# Patient Record
Sex: FEMALE | Race: WHITE | NOT HISPANIC OR LATINO | Employment: OTHER | URBAN - METROPOLITAN AREA
[De-identification: names, ages, dates, MRNs, and addresses within clinical notes are randomized per-mention and may not be internally consistent; named-entity substitution may affect disease eponyms.]

---

## 2018-04-06 ENCOUNTER — OFFICE VISIT CONVERTED (OUTPATIENT)
Dept: FAMILY MEDICINE CLINIC | Facility: CLINIC | Age: 83
End: 2018-04-06
Attending: FAMILY MEDICINE

## 2018-05-24 ENCOUNTER — OFFICE VISIT CONVERTED (OUTPATIENT)
Dept: FAMILY MEDICINE CLINIC | Facility: CLINIC | Age: 83
End: 2018-05-24
Attending: FAMILY MEDICINE

## 2018-07-17 ENCOUNTER — OFFICE VISIT CONVERTED (OUTPATIENT)
Dept: ORTHOPEDIC SURGERY | Facility: CLINIC | Age: 83
End: 2018-07-17
Attending: ORTHOPAEDIC SURGERY

## 2018-08-31 ENCOUNTER — OFFICE VISIT CONVERTED (OUTPATIENT)
Dept: FAMILY MEDICINE CLINIC | Facility: CLINIC | Age: 83
End: 2018-08-31
Attending: FAMILY MEDICINE

## 2018-08-31 ENCOUNTER — CONVERSION ENCOUNTER (OUTPATIENT)
Dept: FAMILY MEDICINE CLINIC | Facility: CLINIC | Age: 83
End: 2018-08-31

## 2018-11-01 ENCOUNTER — OFFICE VISIT CONVERTED (OUTPATIENT)
Dept: FAMILY MEDICINE CLINIC | Facility: CLINIC | Age: 83
End: 2018-11-01
Attending: FAMILY MEDICINE

## 2018-11-01 ENCOUNTER — CONVERSION ENCOUNTER (OUTPATIENT)
Dept: FAMILY MEDICINE CLINIC | Facility: CLINIC | Age: 83
End: 2018-11-01

## 2019-01-11 ENCOUNTER — HOSPITAL ENCOUNTER (OUTPATIENT)
Dept: OTHER | Facility: HOSPITAL | Age: 84
Setting detail: RECURRING SERIES
Discharge: HOME | End: 2019-02-20
Attending: NURSE PRACTITIONER

## 2019-01-15 ENCOUNTER — CONVERSION ENCOUNTER (OUTPATIENT)
Dept: FAMILY MEDICINE CLINIC | Facility: CLINIC | Age: 84
End: 2019-01-15

## 2019-01-15 ENCOUNTER — OFFICE VISIT CONVERTED (OUTPATIENT)
Dept: FAMILY MEDICINE CLINIC | Facility: CLINIC | Age: 84
End: 2019-01-15
Attending: FAMILY MEDICINE

## 2019-02-26 ENCOUNTER — CONVERSION ENCOUNTER (OUTPATIENT)
Dept: FAMILY MEDICINE CLINIC | Facility: CLINIC | Age: 84
End: 2019-02-26

## 2019-02-26 ENCOUNTER — OFFICE VISIT CONVERTED (OUTPATIENT)
Dept: FAMILY MEDICINE CLINIC | Facility: CLINIC | Age: 84
End: 2019-02-26
Attending: FAMILY MEDICINE

## 2019-02-26 ENCOUNTER — HOSPITAL ENCOUNTER (OUTPATIENT)
Dept: FAMILY MEDICINE CLINIC | Facility: CLINIC | Age: 84
Discharge: HOME | End: 2019-02-26
Attending: FAMILY MEDICINE

## 2019-02-26 LAB
EST. AVERAGE GLUCOSE BLD GHB EST-MCNC: 126 MG/DL
HBA1C MFR BLD: 6 % (ref 3.5–5.7)

## 2019-02-27 LAB
CHOLEST SERPL-MCNC: 125 MG/DL (ref 107–200)
CHOLEST/HDLC SERPL: 3.2 {RATIO} (ref 3–6)
HDLC SERPL-MCNC: 39 MG/DL (ref 40–60)
LDLC SERPL CALC-MCNC: 61 MG/DL (ref 70–100)
T4 FREE SERPL-MCNC: 1.2 NG/DL (ref 0.9–1.8)
TRIGL SERPL-MCNC: 125 MG/DL (ref 40–150)
TSH SERPL-ACNC: 2.75 M[IU]/L (ref 0.27–4.2)
VLDLC SERPL-MCNC: 25 MG/DL (ref 5–37)

## 2019-05-06 ENCOUNTER — HOSPITAL ENCOUNTER (OUTPATIENT)
Dept: URGENT CARE | Facility: CLINIC | Age: 84
Discharge: HOME | End: 2019-05-06
Attending: FAMILY MEDICINE

## 2019-05-13 ENCOUNTER — OFFICE VISIT CONVERTED (OUTPATIENT)
Dept: FAMILY MEDICINE CLINIC | Facility: CLINIC | Age: 84
End: 2019-05-13
Attending: FAMILY MEDICINE

## 2019-05-17 ENCOUNTER — HOSPITAL ENCOUNTER (OUTPATIENT)
Dept: OTHER | Facility: HOSPITAL | Age: 84
Discharge: HOME | End: 2019-05-17
Attending: FAMILY MEDICINE

## 2019-06-27 ENCOUNTER — OFFICE VISIT CONVERTED (OUTPATIENT)
Dept: FAMILY MEDICINE CLINIC | Facility: CLINIC | Age: 84
End: 2019-06-27
Attending: FAMILY MEDICINE

## 2019-08-26 ENCOUNTER — OFFICE VISIT CONVERTED (OUTPATIENT)
Dept: FAMILY MEDICINE CLINIC | Facility: CLINIC | Age: 84
End: 2019-08-26
Attending: FAMILY MEDICINE

## 2019-08-26 ENCOUNTER — HOSPITAL ENCOUNTER (OUTPATIENT)
Dept: FAMILY MEDICINE CLINIC | Facility: CLINIC | Age: 84
Discharge: HOME | End: 2019-08-26
Attending: FAMILY MEDICINE

## 2019-08-26 ENCOUNTER — CONVERSION ENCOUNTER (OUTPATIENT)
Dept: FAMILY MEDICINE CLINIC | Facility: CLINIC | Age: 84
End: 2019-08-26

## 2019-08-26 LAB
ALBUMIN SERPL-MCNC: 4.9 G/DL (ref 3.5–5)
ALBUMIN/GLOB SERPL: 1.5 {RATIO} (ref 1.4–2.6)
ALP SERPL-CCNC: 90 U/L (ref 43–160)
ALT SERPL-CCNC: 21 U/L (ref 10–40)
ANION GAP SERPL CALC-SCNC: 19 MMOL/L (ref 8–19)
AST SERPL-CCNC: 19 U/L (ref 15–50)
BASOPHILS # BLD AUTO: 0.1 10*3/UL (ref 0–0.2)
BASOPHILS NFR BLD AUTO: 1.2 % (ref 0–3)
BILIRUB SERPL-MCNC: 0.35 MG/DL (ref 0.2–1.3)
BUN SERPL-MCNC: 14 MG/DL (ref 5–25)
BUN/CREAT SERPL: 21 {RATIO} (ref 6–20)
CALCIUM SERPL-MCNC: 9.8 MG/DL (ref 8.7–10.4)
CHLORIDE SERPL-SCNC: 104 MMOL/L (ref 99–111)
CHOLEST SERPL-MCNC: 143 MG/DL (ref 107–200)
CHOLEST/HDLC SERPL: 3.3 {RATIO} (ref 3–6)
CONV ABS IMM GRAN: 0.03 10*3/UL (ref 0–0.2)
CONV CO2: 23 MMOL/L (ref 22–32)
CONV IMMATURE GRAN: 0.3 % (ref 0–1.8)
CONV TOTAL PROTEIN: 8.1 G/DL (ref 6.3–8.2)
CREAT UR-MCNC: 0.67 MG/DL (ref 0.5–0.9)
DEPRECATED RDW RBC AUTO: 45.2 FL (ref 36.4–46.3)
EOSINOPHIL # BLD AUTO: 0.44 10*3/UL (ref 0–0.7)
EOSINOPHIL # BLD AUTO: 5.1 % (ref 0–7)
ERYTHROCYTE [DISTWIDTH] IN BLOOD BY AUTOMATED COUNT: 13.2 % (ref 11.7–14.4)
EST. AVERAGE GLUCOSE BLD GHB EST-MCNC: 114 MG/DL
GFR SERPLBLD BASED ON 1.73 SQ M-ARVRAT: >60 ML/MIN/{1.73_M2}
GLOBULIN UR ELPH-MCNC: 3.2 G/DL (ref 2–3.5)
GLUCOSE SERPL-MCNC: 99 MG/DL (ref 65–99)
HBA1C MFR BLD: 5.6 % (ref 3.5–5.7)
HCT VFR BLD AUTO: 47.6 % (ref 37–47)
HDLC SERPL-MCNC: 44 MG/DL (ref 40–60)
HGB BLD-MCNC: 15.5 G/DL (ref 12–16)
LDLC SERPL CALC-MCNC: 72 MG/DL (ref 70–100)
LYMPHOCYTES # BLD AUTO: 2.39 10*3/UL (ref 1–5)
LYMPHOCYTES NFR BLD AUTO: 27.9 % (ref 20–45)
MCH RBC QN AUTO: 30.2 PG (ref 27–31)
MCHC RBC AUTO-ENTMCNC: 32.6 G/DL (ref 33–37)
MCV RBC AUTO: 92.8 FL (ref 81–99)
MONOCYTES # BLD AUTO: 0.83 10*3/UL (ref 0.2–1.2)
MONOCYTES NFR BLD AUTO: 9.7 % (ref 3–10)
NEUTROPHILS # BLD AUTO: 4.79 10*3/UL (ref 2–8)
NEUTROPHILS NFR BLD AUTO: 55.8 % (ref 30–85)
NRBC CBCN: 0 % (ref 0–0.7)
OSMOLALITY SERPL CALC.SUM OF ELEC: 295 MOSM/KG (ref 273–304)
PLATELET # BLD AUTO: 295 10*3/UL (ref 130–400)
PMV BLD AUTO: 12.3 FL (ref 9.4–12.3)
POTASSIUM SERPL-SCNC: 3.7 MMOL/L (ref 3.5–5.3)
RBC # BLD AUTO: 5.13 10*6/UL (ref 4.2–5.4)
SODIUM SERPL-SCNC: 142 MMOL/L (ref 135–147)
T4 FREE SERPL-MCNC: 1.4 NG/DL (ref 0.9–1.8)
TRIGL SERPL-MCNC: 133 MG/DL (ref 40–150)
TSH SERPL-ACNC: 2.31 M[IU]/L (ref 0.27–4.2)
VLDLC SERPL-MCNC: 27 MG/DL (ref 5–37)
WBC # BLD AUTO: 8.58 10*3/UL (ref 4.8–10.8)

## 2019-08-27 LAB — 25(OH)D3 SERPL-MCNC: 43.7 NG/ML (ref 30–100)

## 2019-10-11 ENCOUNTER — OFFICE VISIT CONVERTED (OUTPATIENT)
Dept: FAMILY MEDICINE CLINIC | Facility: CLINIC | Age: 84
End: 2019-10-11
Attending: FAMILY MEDICINE

## 2019-10-13 ENCOUNTER — HOSPITAL ENCOUNTER (OUTPATIENT)
Dept: GENERAL RADIOLOGY | Facility: HOSPITAL | Age: 84
Discharge: HOME | End: 2019-10-13
Attending: FAMILY MEDICINE

## 2019-11-01 ENCOUNTER — OFFICE VISIT CONVERTED (OUTPATIENT)
Dept: FAMILY MEDICINE CLINIC | Facility: CLINIC | Age: 84
End: 2019-11-01
Attending: FAMILY MEDICINE

## 2020-05-30 ENCOUNTER — HOSPITAL ENCOUNTER (OUTPATIENT)
Dept: URGENT CARE | Facility: CLINIC | Age: 85
Discharge: HOME | End: 2020-05-30
Attending: FAMILY MEDICINE

## 2020-06-01 LAB — BACTERIA UR CULT: NORMAL

## 2020-06-30 ENCOUNTER — OFFICE VISIT CONVERTED (OUTPATIENT)
Dept: INTERNAL MEDICINE | Facility: CLINIC | Age: 85
End: 2020-06-30
Attending: PHYSICIAN ASSISTANT

## 2020-06-30 ENCOUNTER — CONVERSION ENCOUNTER (OUTPATIENT)
Dept: INTERNAL MEDICINE | Facility: CLINIC | Age: 85
End: 2020-06-30

## 2020-07-06 ENCOUNTER — OFFICE VISIT CONVERTED (OUTPATIENT)
Dept: INTERNAL MEDICINE | Facility: CLINIC | Age: 85
End: 2020-07-06
Attending: PHYSICIAN ASSISTANT

## 2020-07-06 ENCOUNTER — HOSPITAL ENCOUNTER (OUTPATIENT)
Dept: OTHER | Facility: HOSPITAL | Age: 85
Discharge: HOME | End: 2020-07-06
Attending: PHYSICIAN ASSISTANT

## 2020-07-06 LAB
25(OH)D3 SERPL-MCNC: 46 NG/ML (ref 30–100)
ALBUMIN SERPL-MCNC: 4.4 G/DL (ref 3.5–5)
ALBUMIN/GLOB SERPL: 1.4 {RATIO} (ref 1.4–2.6)
ALP SERPL-CCNC: 93 U/L (ref 43–160)
ALT SERPL-CCNC: 20 U/L (ref 10–40)
ANION GAP SERPL CALC-SCNC: 17 MMOL/L (ref 8–19)
AST SERPL-CCNC: 19 U/L (ref 15–50)
BASOPHILS # BLD AUTO: 0.05 10*3/UL (ref 0–0.2)
BASOPHILS NFR BLD AUTO: 0.7 % (ref 0–3)
BILIRUB SERPL-MCNC: 0.42 MG/DL (ref 0.2–1.3)
BUN SERPL-MCNC: 17 MG/DL (ref 5–25)
BUN/CREAT SERPL: 19 {RATIO} (ref 6–20)
CALCIUM SERPL-MCNC: 9.6 MG/DL (ref 8.7–10.4)
CHLORIDE SERPL-SCNC: 105 MMOL/L (ref 99–111)
CHOLEST SERPL-MCNC: 140 MG/DL (ref 107–200)
CHOLEST/HDLC SERPL: 3.8 {RATIO} (ref 3–6)
CONV ABS IMM GRAN: 0.02 10*3/UL (ref 0–0.2)
CONV CO2: 21 MMOL/L (ref 22–32)
CONV IMMATURE GRAN: 0.3 % (ref 0–1.8)
CONV TOTAL PROTEIN: 7.5 G/DL (ref 6.3–8.2)
CREAT UR-MCNC: 0.91 MG/DL (ref 0.5–0.9)
DEPRECATED RDW RBC AUTO: 48.3 FL (ref 36.4–46.3)
EOSINOPHIL # BLD AUTO: 0.35 10*3/UL (ref 0–0.7)
EOSINOPHIL # BLD AUTO: 4.8 % (ref 0–7)
ERYTHROCYTE [DISTWIDTH] IN BLOOD BY AUTOMATED COUNT: 14.1 % (ref 11.7–14.4)
GFR SERPLBLD BASED ON 1.73 SQ M-ARVRAT: 58 ML/MIN/{1.73_M2}
GLOBULIN UR ELPH-MCNC: 3.1 G/DL (ref 2–3.5)
GLUCOSE SERPL-MCNC: 120 MG/DL (ref 65–99)
HCT VFR BLD AUTO: 44.9 % (ref 37–47)
HDLC SERPL-MCNC: 37 MG/DL (ref 40–60)
HGB BLD-MCNC: 14.5 G/DL (ref 12–16)
LDLC SERPL CALC-MCNC: 66 MG/DL (ref 70–100)
LYMPHOCYTES # BLD AUTO: 1.9 10*3/UL (ref 1–5)
LYMPHOCYTES NFR BLD AUTO: 25.9 % (ref 20–45)
MCH RBC QN AUTO: 30 PG (ref 27–31)
MCHC RBC AUTO-ENTMCNC: 32.3 G/DL (ref 33–37)
MCV RBC AUTO: 92.8 FL (ref 81–99)
MONOCYTES # BLD AUTO: 0.8 10*3/UL (ref 0.2–1.2)
MONOCYTES NFR BLD AUTO: 10.9 % (ref 3–10)
NEUTROPHILS # BLD AUTO: 4.22 10*3/UL (ref 2–8)
NEUTROPHILS NFR BLD AUTO: 57.4 % (ref 30–85)
NRBC CBCN: 0 % (ref 0–0.7)
OSMOLALITY SERPL CALC.SUM OF ELEC: 291 MOSM/KG (ref 273–304)
PLATELET # BLD AUTO: 304 10*3/UL (ref 130–400)
PMV BLD AUTO: 11.8 FL (ref 9.4–12.3)
POTASSIUM SERPL-SCNC: 3.7 MMOL/L (ref 3.5–5.3)
RBC # BLD AUTO: 4.84 10*6/UL (ref 4.2–5.4)
SODIUM SERPL-SCNC: 139 MMOL/L (ref 135–147)
T4 FREE SERPL-MCNC: 1.5 NG/DL (ref 0.9–1.8)
TRIGL SERPL-MCNC: 184 MG/DL (ref 40–150)
TSH SERPL-ACNC: 1.77 M[IU]/L (ref 0.27–4.2)
VLDLC SERPL-MCNC: 37 MG/DL (ref 5–37)
WBC # BLD AUTO: 7.34 10*3/UL (ref 4.8–10.8)

## 2020-07-10 LAB
EST. AVERAGE GLUCOSE BLD GHB EST-MCNC: 123 MG/DL
HBA1C MFR BLD: 5.9 % (ref 3.5–5.7)

## 2020-08-06 ENCOUNTER — CONVERSION ENCOUNTER (OUTPATIENT)
Dept: OTHER | Facility: HOSPITAL | Age: 85
End: 2020-08-06

## 2020-08-06 ENCOUNTER — OFFICE VISIT CONVERTED (OUTPATIENT)
Dept: INTERNAL MEDICINE | Facility: CLINIC | Age: 85
End: 2020-08-06
Attending: PHYSICIAN ASSISTANT

## 2020-08-06 ENCOUNTER — OFFICE VISIT CONVERTED (OUTPATIENT)
Dept: GASTROENTEROLOGY | Facility: CLINIC | Age: 85
End: 2020-08-06
Attending: NURSE PRACTITIONER

## 2020-09-01 ENCOUNTER — HOSPITAL ENCOUNTER (OUTPATIENT)
Dept: URGENT CARE | Facility: CLINIC | Age: 85
Discharge: HOME | End: 2020-09-01

## 2020-09-23 ENCOUNTER — CONVERSION ENCOUNTER (OUTPATIENT)
Dept: INTERNAL MEDICINE | Facility: CLINIC | Age: 85
End: 2020-09-23

## 2020-09-23 ENCOUNTER — OFFICE VISIT CONVERTED (OUTPATIENT)
Dept: INTERNAL MEDICINE | Facility: CLINIC | Age: 85
End: 2020-09-23
Attending: PHYSICIAN ASSISTANT

## 2021-02-12 ENCOUNTER — HOSPITAL ENCOUNTER (OUTPATIENT)
Dept: OTHER | Facility: HOSPITAL | Age: 86
Discharge: HOME | End: 2021-02-12
Attending: STUDENT IN AN ORGANIZED HEALTH CARE EDUCATION/TRAINING PROGRAM

## 2021-02-12 ENCOUNTER — OFFICE VISIT CONVERTED (OUTPATIENT)
Dept: INTERNAL MEDICINE | Facility: CLINIC | Age: 86
End: 2021-02-12
Attending: STUDENT IN AN ORGANIZED HEALTH CARE EDUCATION/TRAINING PROGRAM

## 2021-02-12 LAB
25(OH)D3 SERPL-MCNC: 44.5 NG/ML (ref 30–100)
ALBUMIN SERPL-MCNC: 4.9 G/DL (ref 3.5–5)
ALBUMIN/GLOB SERPL: 1.5 {RATIO} (ref 1.4–2.6)
ALP SERPL-CCNC: 110 U/L (ref 43–160)
ALT SERPL-CCNC: 17 U/L (ref 10–40)
ANION GAP SERPL CALC-SCNC: 15 MMOL/L (ref 8–19)
AST SERPL-CCNC: 18 U/L (ref 15–50)
BASOPHILS # BLD AUTO: 0.08 10*3/UL (ref 0–0.2)
BASOPHILS NFR BLD AUTO: 1 % (ref 0–3)
BILIRUB SERPL-MCNC: 0.34 MG/DL (ref 0.2–1.3)
BUN SERPL-MCNC: 15 MG/DL (ref 5–25)
BUN/CREAT SERPL: 21 {RATIO} (ref 6–20)
CALCIUM SERPL-MCNC: 10.3 MG/DL (ref 8.7–10.4)
CHLORIDE SERPL-SCNC: 103 MMOL/L (ref 99–111)
CHOLEST SERPL-MCNC: 143 MG/DL (ref 107–200)
CHOLEST/HDLC SERPL: 3 {RATIO} (ref 3–6)
CONV ABS IMM GRAN: 0.02 10*3/UL (ref 0–0.2)
CONV CO2: 26 MMOL/L (ref 22–32)
CONV IMMATURE GRAN: 0.2 % (ref 0–1.8)
CONV TOTAL PROTEIN: 8.1 G/DL (ref 6.3–8.2)
CREAT UR-MCNC: 0.73 MG/DL (ref 0.5–0.9)
DEPRECATED RDW RBC AUTO: 43.8 FL (ref 36.4–46.3)
EOSINOPHIL # BLD AUTO: 0.49 10*3/UL (ref 0–0.7)
EOSINOPHIL # BLD AUTO: 6 % (ref 0–7)
ERYTHROCYTE [DISTWIDTH] IN BLOOD BY AUTOMATED COUNT: 13.2 % (ref 11.7–14.4)
GFR SERPLBLD BASED ON 1.73 SQ M-ARVRAT: >60 ML/MIN/{1.73_M2}
GLOBULIN UR ELPH-MCNC: 3.2 G/DL (ref 2–3.5)
GLUCOSE SERPL-MCNC: 132 MG/DL (ref 65–99)
HCT VFR BLD AUTO: 48.2 % (ref 37–47)
HDLC SERPL-MCNC: 47 MG/DL (ref 40–60)
HGB BLD-MCNC: 16.2 G/DL (ref 12–16)
LDLC SERPL CALC-MCNC: 75 MG/DL (ref 70–100)
LYMPHOCYTES # BLD AUTO: 2.35 10*3/UL (ref 1–5)
LYMPHOCYTES NFR BLD AUTO: 28.8 % (ref 20–45)
MCH RBC QN AUTO: 30.5 PG (ref 27–31)
MCHC RBC AUTO-ENTMCNC: 33.6 G/DL (ref 33–37)
MCV RBC AUTO: 90.8 FL (ref 81–99)
MONOCYTES # BLD AUTO: 0.79 10*3/UL (ref 0.2–1.2)
MONOCYTES NFR BLD AUTO: 9.7 % (ref 3–10)
NEUTROPHILS # BLD AUTO: 4.43 10*3/UL (ref 2–8)
NEUTROPHILS NFR BLD AUTO: 54.3 % (ref 30–85)
NRBC CBCN: 0 % (ref 0–0.7)
OSMOLALITY SERPL CALC.SUM OF ELEC: 291 MOSM/KG (ref 273–304)
PLATELET # BLD AUTO: 358 10*3/UL (ref 130–400)
PMV BLD AUTO: 11.5 FL (ref 9.4–12.3)
POTASSIUM SERPL-SCNC: 5 MMOL/L (ref 3.5–5.3)
RBC # BLD AUTO: 5.31 10*6/UL (ref 4.2–5.4)
SODIUM SERPL-SCNC: 139 MMOL/L (ref 135–147)
TRIGL SERPL-MCNC: 106 MG/DL (ref 40–150)
TSH SERPL-ACNC: 6.25 M[IU]/L (ref 0.27–4.2)
VLDLC SERPL-MCNC: 21 MG/DL (ref 5–37)
WBC # BLD AUTO: 8.16 10*3/UL (ref 4.8–10.8)

## 2021-02-24 ENCOUNTER — OFFICE VISIT CONVERTED (OUTPATIENT)
Dept: INTERNAL MEDICINE | Facility: CLINIC | Age: 86
End: 2021-02-24
Attending: STUDENT IN AN ORGANIZED HEALTH CARE EDUCATION/TRAINING PROGRAM

## 2021-05-10 NOTE — H&P
History and Physical      Patient Name: Saba Galarza   Patient ID: [id number]   Sex: Female   YOB: 1935    Primary Care Provider: Tosin Bai PA-C   Referring Provider: Tosin Bai PA-C    Visit Date: August 6, 2020    Provider: RADHA Byers   Location: [facility]   Location Address: [address] [place] [address]  [place]  [id number]   Location Phone: [phone]          Chief Complaint  · Fecal incontinence      History Of Present Illness  The patient is a(n) 84 year old /White female who is in the office today with a referral from Tosin Bai PA-C for evaluation of fecal incontince.        Patient presents today with complaints of fecal incontinence.  She has had this problem for approximately 1 year now.  The fecal incontinence occurs randomly.  She does report that she has urine leakage as well.  She denies any abdominal surgeries.  She reports having normal bowel movements, but will have occasional constipation and diarrhea.  She denies nocturnal diarrhea.  She has reported seen 1-2 incidences of blood in her stool approximately 6 to 8 weeks ago.  She reports it was bright red and in the toilet bowl and with wiping.  She denies abdominal pain.  She denies nausea and vomiting.  She denies family history of colon cancer.    Labs 07/06/2020: Hemoglobin 14.5, hematocrit 44.9, platelets 304, ALT 20, AST 19, total bili 0.42, alk phos 93       Past Medical History  Anemia, Unspecified; Arthritis; CKD (chronic kidney disease) stage 3, GFR 30-59 ml/min; DDD (degenerative disc disease), lumbar; Depression; Depression with anxiety; Diabetes Mellitus, Type II: controlled; Forgetfulness; Hyperlipemia; Hypertension; Hypothyroidism; Limb Swelling; Low back pain; Paranoid delusion; Paranoid schizophrenia; Primary osteoarthritis of both knees; Primary osteoarthritis of left hip; Seasonal allergies; Stroke; Stroke         Past Surgical History  Colonoscopy; Dilation and Curettage; Eye  Implant; Tonsillectomy         Medication List  amlodipine 5 mg oral tablet; Aspir-81 81 mg oral tablet,delayed release (DR/EC); atorvastatin 10 mg oral tablet; Calcium 500 + D 500 mg(1,250mg) -200 unit oral tablet; cetirizine 10 mg oral tablet; Detrol LA 4 mg oral capsule,extended release 24hr; levothyroxine 88 mcg oral tablet; methocarbamol 750 mg oral tablet; Micardis 20 mg oral tablet; potassium chloride 20 mEq oral tablet extended release; triamcinolone acetonide 0.1 % topical cream; Vitamin D3 1,000 unit oral tablet         Allergy List  PENICILLINS; SULFA (SULFONAMIDES)       Allergies Reconciled  Family Medical History  Breast Neoplasm, Malignant; Heart Disease; Cancer, Unspecified; Diabetes, unspecified type; Hypertension; Family history of certain chronic disabling diseases; arthritis; Osteoporosis         Social History  Alcohol (Never); lives alone; Recreational Drug Use (Never); Retired.; Tobacco (Never);          Review of Systems  · Constitutional  o Denies  o : chills, fever  · Eyes  o Denies  o : blurred vision, changes in vision  · Cardiovascular  o Denies  o : chest pain  · Respiratory  o Denies  o : shortness of breath  · Gastrointestinal  o Denies  o : nausea, vomiting  · Genitourinary  o Denies  o : dysuria, blood in urine  · Integument  o Denies  o : rash  · Neurologic  o Denies  o : tingling or numbness  · Musculoskeletal  o Denies  o : joint pain  · Endocrine  o Denies  o : weight gain, weight loss  · Psychiatric  o Denies  o : anxiety, depression      Vitals  Date Time BP Position Site L\R Cuff Size HR RR TEMP (F) WT  HT  BMI kg/m2 BSA m2 O2 Sat        08/06/2020 10:37 /70 Sitting    86 - R 16  136lbs 0oz 5'   26.56 1.62 100 %          Physical Examination  · Constitutional  o Appearance  o : Well-nourished, well developed, alert, in no acute distress, alert and oriented X 3.  · Eyes  o Vision  o :   § Visual Fields  § : eyes move symmetrical in all directions  o Sclerae  o :  sclerae anicteric  o Pupils and Irises  o : pupils equal and symetrical  · Neck  o Inspection/Palpation  o : Trachea is midline, no adenopathy  o Thyroid  o : Thyroid is not enlarged  · Respiratory  o Respiratory Effort  o : Breathing is unlabored.  o Inspection of Chest  o : normal appearance, no retractions  o Auscultation of Lungs  o : Chest is clear to auscultation bilaterally.  · Cardiovascular  o Heart  o :   § Auscultation of Heart  § : no murmurs, rubs, or gallops  · Gastrointestinal  o Abdominal Examination  o : Abdomen is soft, nontender to palpation, with normal active bowel sounds, no appreciable hepatosplenomegaly.  · Genitourinary  o Digital Rectal Examination  o : Deferred  · Skin and Subcutaneous Tissue  o General Inspection  o : Skin is without focal lesions. Skin turgor is normal.  · Psychiatric  o Mood and Affect  o : Mood and affect are appropriate to circumstances.          Assessment  · Rectal Bleeding     569.3/K62.5  · Fecal incontinence     787.60/R15.9      Plan  · Instructions  o 84-year-old female presenting today with complaints of fecal incontinence. She reports she also has urinary incontinence as well. She does report having seen blood in her stool a few times. I have recommended undergoing a colonoscopy for her symptoms, but the patient would like to defer at present. I have recommended undergoing pelvic floor therapy, as I suspect she may have pelvic floor dysfunction. The patient would like to defer at present. I have recommended taking her Metamucil regularly to help with her bowel movements. I will have her follow-up in 3 months to reevaluate her symptoms. Patient is agreeable to plan.            Electronically Signed by: RADHA Byers -Author on August 6, 2020 11:00:13 AM  Electronically Co-signed by: Guillermo Iraheta MD -Reviewer on August 9, 2020 09:32:51 PM

## 2021-05-10 NOTE — H&P
History and Physical      Patient Name: Saba Galarza   Patient ID: [id number]   Sex: Female   YOB: 1935    Primary Care Provider: Anthony David DO    Visit Date: July 6, 2020    Provider: Tosin Bai PA-C   Location: [facility]   Location Address: [address] [place] [address], [address]  [place], [place]  [id number]   Location Phone: [phone]          Chief Complaint  · Est care  · New patient       History Of Present Illness  Saba Galarza is a 84 year old /White female who presents for evaluation and treatment of:      EST pcp  Previous PCP: Dr David. A nurse has been coming to her home but she has not seen pcp in 10 months per pt.  Last bloodwork: has been months ago  Last colonoscopy: unsure but believes she is due  Last mammogram: unsure    Dx with schizophrenia. She was previously seeing psychiatrist- Dr. Howard  Admits to hearing things others do not hear. Denies seeing things others do not see.  She states she was having difficulty remembering to take her medication. She is not sure if she can take medication.  pt wants to talk depression and anxiety. She has been feeling down recently. She has felt anxious.   She does not want medications because she does not feel they really help her.   Denies si/hi.    Hypothyroid: pt needs refills on Synthroid     vit d deficiency: needs refill    Bowel incontinence: denies blood in stool. She would like to see GI.   She is not utd on colonoscopy.    Skin lesion: r knee. She states derm told her it was squamous cell carcinoma.   She states she was supposed to see surgeon to remove the border but COVID cancelled her apt.    bilateral knee pain: sees ortho in [place]. She does not remember their name  She states she has had injection into knees in the past which helped  She will call and set up follow up with their office       Past Medical History  Disease Name Date Onset Notes   Anemia, Unspecified --  --    Arthritis --  --     CKD (chronic kidney disease) stage 3, GFR 30-59 ml/min 09/14/2016 --    DDD (degenerative disc disease), lumbar 01/15/2019 --    Depression --  --    Depression with anxiety --  --    Diabetes Mellitus, Type II: controlled --  --    Forgetfulness --  --    Hyperlipemia --  --    Hypertension --  --    Hypothyroidism --  --    Limb Swelling --  --    Low back pain 12/21/2015 --    Paranoid delusion --  --    Paranoid schizophrenia 05/19/2016 --    Primary osteoarthritis of both knees 10/06/2015 --    Primary osteoarthritis of left hip 01/15/2019 --    Seasonal allergies --  --    Stroke --  --    Stroke 2013 or 2014 --          Past Surgical History  Procedure Name Date Notes   Colonoscopy --  --    Dilation and Curettage 1980 --    Eye Implant --  yes   Tonsillectomy --  --          Medication List  Name Date Started Instructions   amlodipine 5 mg oral tablet 05/06/2019 take 1 tablet (5 mg) by oral route once daily for 90 days   Aspir-81 81 mg oral tablet,delayed release (DR/EC)  take 1 tablet (81 mg) by oral route once daily   atorvastatin 10 mg oral tablet 05/06/2019 take 1 tablet (10 mg) by oral route once daily at bedtime for 90 days   Calcium 500 + D 500 mg(1,250mg) -200 unit oral tablet 07/06/2020 take 1 tablet by oral route daily for 90 days   cetirizine 10 mg oral tablet 05/06/2019 take 1 tablet (10 mg) by oral route once daily for 90 days   Detrol LA 4 mg oral capsule,extended release 24hr 02/26/2019 take 1 capsule (4 mg) by oral route once daily for 90 days   levothyroxine 88 mcg oral tablet 07/06/2020 take 1 tablet (88 mcg) by oral route once daily for 90 days   methocarbamol 750 mg oral tablet 05/06/2019 take 1 tablet (750 mg) by oral route 3 times per day for 30 days as needed   Micardis 20 mg oral tablet 05/06/2019 take 1 tablet (20 mg) by oral route once daily for 90 days   potassium chloride 20 mEq oral tablet extended release 05/06/2019 take 1/2 tablet by mouth every day   triamcinolone acetonide  0.1 % topical cream 06/30/2020 apply a thin layer to the affected area(s) by topical route 2 times per day   Vitamin D3 1,000 unit oral tablet  take 1 tablet by oral route daily         Allergy List  Allergen Name Date Reaction Notes   PENICILLINS --  --  --    SULFA (SULFONAMIDES) --  --  --        Allergies Reconciled  Family Medical History  Disease Name Relative/Age Notes   Breast Neoplasm, Malignant Sister/   --    Heart Disease Father/   Father  Father; Brother   Cancer, Unspecified Sister/   Sister   Diabetes, unspecified type Daughter/   Daughter  Father  Father; Daughter   Hypertension Mother/   --    Family history of certain chronic disabling diseases; arthritis Sister/   Sister   Osteoporosis Mother/   Mother         Social History  Finding Status Start/Stop Quantity Notes   Alcohol Never --/-- --  02/26/2019 - 01/15/2019 -    lives alone --  --/-- --  --    Recreational Drug Use Never --/-- --  no   Retired. --  --/-- --  --    Tobacco Never --/-- --  never smoker    --  --/-- --  --          Review of Systems  · Constitutional  o Denies  o : fatigue, night sweats  · Eyes  o Denies  o : double vision, blurred vision  · HENT  o Denies  o : vertigo, recent head injury  · Cardiovascular  o Denies  o : chest pain, irregular heart beats  · Respiratory  o Denies  o : shortness of breath, productive cough  · Gastrointestinal  o Admits  o : fecal incontinence  o Denies  o : nausea, vomiting, diarrhea, constipation, reflux, abdominal pain, early satiety  · Genitourinary  o Denies  o : dysuria, urinary retention  · Integument  o Admits  o : new skin lesions  o Denies  o : hair growth change  · Neurologic  o Denies  o : altered mental status, seizures  · Musculoskeletal  o Denies  o : joint swelling, limitation of motion  · Endocrine  o Denies  o : cold intolerance, heat intolerance  · Psychiatric  o Admits  o : anxiety, depression  o Denies  o : suicidal ideation, homicidal  ideation  · Heme-Lymph  o Denies  o : petechiae, lymph node enlargement or tenderness  · Allergic-Immunologic  o Denies  o : frequent illnesses      Vitals  Date Time BP Position Site L\R Cuff Size HR RR TEMP (F) WT  HT  BMI kg/m2 BSA m2 O2 Sat HC       11/01/2019 10:40 /81 Sitting    87 - R   137lbs 8oz 5'   26.85 1.62 97 %    06/30/2020 11:08 /74 Sitting    79 - R  96.7 138lbs 8oz 5'   27.05 1.63 99 %    07/06/2020 03:23 /76 Sitting    74 - R  97.1 136lbs 2oz 5'   26.58 1.62 97 %          Physical Examination  · Constitutional  o Appearance  o : no acute distress, well-nourished  · Head and Face  o Head  o :   § Inspection  § : atraumatic, normocephalic  · Ears, Nose, Mouth and Throat  o Ears  o :   § External Ears  § : normal  § Otoscopic Examination  § : tympanic membrane appearance within normal limits bilaterally, no tympanic membrane perforations present, tympanic membrane mobility normal bilaterally  o Nose  o :   § Intranasal Exam  § : nares patent  o Oral Cavity  o :   § Oral Mucosa  § : moist mucous membranes  o Throat  o :   § Oropharynx  § : no inflammation or lesions present, tonsils within normal limits  · Neck  o Thyroid  o : gland size normal, nontender, no nodules or masses present on palpation, thyroid motion normal during swallowing  · Respiratory  o Respiratory Effort  o : breathing comfortably, symmetric chest rise  o Auscultation of Lungs  o : clear to asculatation bilaterally, no wheezes, rales, or rhonchii  · Cardiovascular  o Heart  o :   § Auscultation of Heart  § : regular rate and rhythm, no murmurs, rubs, or gallops  o Peripheral Vascular System  o :   § Extremities  § : no edema  · Gastrointestinal  o Abdominal Examination  o : abdomen nontender to palpation, tone normal without rigidity or guarding, no masses present, abdominal contour scaphoid  · Lymphatic  o Neck  o : no lymphadenopathy present  o Supraclavicular Nodes  o : no supraclavicular nodes  · Skin and  Subcutaneous Tissue  o General Inspection  o : no rashes present, no lesions present, no areas of discoloration  · Neurologic  o Mental Status Examination  o :   § Orientation  § : grossly oriented to person, place and time  o Gait and Station  o :   § Gait Screening  § : normal gait          Assessment  · Vitamin D deficiency     268.9/E55.9  labs today  · CKD (chronic kidney disease) stage 3, GFR 30-59 ml/min     585.3/N18.3  labs ordered today  · Paranoid schizophrenia     295.30/F20.0  Discussed need to follow up with psych to discuss medication options for schizophrenia, depression, and anxiety. Pt declines to release records from psych to our office. to er if si/hi.  · Hyperlipemia     272.4/E78.5  labs today  · Hypertension     401.9/I10  · Depression with anxiety     300.4/F41.8  · Hypothyroid     244.9/E03.9  labs today to make sure dose appropriate.  · Bowel incontinence     787.60/R15.9  discussed concern for incontinence and need to start workup. referred to GI for eval/tx, pt states she is not sure she wants to start workup at this time or not but to go ahead and refer so she has the option if she decides to look into it.  · Squamous cell carcinoma in situ     234.9/D09.9  Needs to re-est with derm locally, will request records from derm in [place] where pt states she had biopsy taken at the end of last yr.   · Knee pain     719.46/M25.569  Pt will call and set up follow up with ortho she is est with in [place]  · Skin lesion     709.9/L98.9    Problems Reconciled  Plan  · Orders  o Free T4 (62553) - 295.30/F20.0, 272.4/E78.5, 401.9/I10, 244.9/E03.9 - 07/06/2020  o Physical, Primary Care Panel (CBC, CMP, Lipid, TSH) [facility] (96275, 97462, 50041, 17571) - 585.3/N18.3, 272.4/E78.5, 401.9/I10, 244.9/E03.9 - 07/06/2020  o Vitamin D (25-Hydroxy) Level (77565) - 585.3/N18.3, 401.9/I10, 244.9/E03.9, 268.9/E55.9 - 07/06/2020  o ACO-39: Current medications updated and reviewed () - -  07/06/2020  o Gastroenterology Consultation (GASTR) - 787.60/R15.9 - 07/06/2020  o DERMATOLOGY CONSULTATION (DERMA) - 234.9/D09.9, 709.9/L98.9 - 07/06/2020   [facility]  · Medications  o Calcium 500 + D 500 mg(1,250mg) -200 unit oral tablet   SIG: take 1 tablet by oral route daily for 90 days   DISP: (90) tablets with 0 refills  Adjusted on 07/06/2020     o levothyroxine 88 mcg oral tablet   SIG: take 1 tablet (88 mcg) by oral route once daily for 90 days   DISP: (90) tablets with 0 refills  Adjusted on 07/06/2020     o Medications have been Reconciled  o Transition of Care or Provider Policy  · Instructions  o Handouts were given to patient: local psychaitrists  o Patient was educated/instructed on their diagnosis, treatment and medications prior to discharge from the clinic today.  o Electronically Identified Patient Education Materials Provided Electronically  · Disposition  o Call or Return if symptoms worsen or persist.  o Follow up in 1 month  o Labs drawn in office today  o Care Transition  o MONICA Sent            Electronically Signed by: Tosin Bai PA-C -Author on July 6, 2020 04:52:15 PM

## 2021-05-13 NOTE — PROGRESS NOTES
Progress Note      Patient Name: Saba Galarza   Patient ID: [id number]   Sex: Female   YOB: 1935    Primary Care Provider: Anthony David DO    Visit Date: June 30, 2020    Provider: Riddhi Garrett PA-C   Location: [facility]   Location Address: [address] [place] [address], [address]  [place], [place]  [id number]   Location Phone: [phone]          Chief Complaint  · Rash  · Possible poison ivy       History Of Present Illness  Saba Galarza is a 84 year old /White female who presents for evaluation and treatment of:      pt has a rash all over her body x 2 days.  Mostly in legs, arm, hand.  It is itchy.  It seems to be spreading.  Patient admits to possibly coming in contact with poison ivy over the past few days as she has been working outside more.  She has put some Cortisone cream on it.  Patient has had rashes like this in the past when she had poison ivy.  Patient has not started any new medications recently.       Past Medical History  Disease Name Date Onset Notes   Anemia, Unspecified --  --    Arthritis --  --    CKD (chronic kidney disease) stage 3, GFR 30-59 ml/min 09/14/2016 --    DDD (degenerative disc disease), lumbar 01/15/2019 --    Depression with anxiety --  --    Diabetes Mellitus, Type II: controlled --  --    Forgetfulness --  --    Hyperlipemia --  --    Hypertension --  --    Hypothyroidism --  --    Limb Swelling --  --    Low back pain 12/21/2015 --    Paranoid delusion --  --    Paranoid schizophrenia 05/19/2016 --    Primary osteoarthritis of both knees 10/06/2015 --    Primary osteoarthritis of left hip 01/15/2019 --    Seasonal allergies --  --    Stroke --  --    Stroke 2013 or 2014 --          Past Surgical History  Procedure Name Date Notes   Colonoscopy --  --    Dilation and Curettage 1980 --    Eye Implant --  yes   Tonsillectomy --  --          Medication List  Name Date Started Instructions   amlodipine 5 mg oral tablet 05/06/2019 take 1  tablet (5 mg) by oral route once daily for 90 days   Aspir-81 81 mg oral tablet,delayed release (DR/EC)  take 1 tablet (81 mg) by oral route once daily   atorvastatin 10 mg oral tablet 05/06/2019 take 1 tablet (10 mg) by oral route once daily at bedtime for 90 days   Calcium 500 + D 500 mg(1,250mg) -200 unit oral tablet 05/06/2019 take 1 tablet by oral route daily for 90 days   cetirizine 10 mg oral tablet 05/06/2019 take 1 tablet (10 mg) by oral route once daily for 90 days   Detrol LA 4 mg oral capsule,extended release 24hr 02/26/2019 take 1 capsule (4 mg) by oral route once daily for 90 days   levothyroxine 88 mcg oral tablet 08/27/2019 take 1 tablet (88 mcg) by oral route once daily for 90 days   methocarbamol 750 mg oral tablet 05/06/2019 take 1 tablet (750 mg) by oral route 3 times per day for 30 days as needed   Micardis 20 mg oral tablet 05/06/2019 take 1 tablet (20 mg) by oral route once daily for 90 days   potassium chloride 20 mEq oral tablet extended release 05/06/2019 take 1/2 tablet by mouth every day   triamcinolone acetonide 0.1 % topical cream 06/30/2020 apply a thin layer to the affected area(s) by topical route 2 times per day   Vitamin D3 1,000 unit oral tablet  take 1 tablet by oral route daily         Allergy List  Allergen Name Date Reaction Notes   PENICILLINS --  --  --    SULFA (SULFONAMIDES) --  --  --        Allergies Reconciled  Family Medical History  Disease Name Relative/Age Notes   Breast Neoplasm, Malignant Sister/   --    Heart Disease Father/   Father  Father; Brother   Cancer, Unspecified Sister/   Sister   Diabetes, unspecified type Daughter/   Daughter  Father  Father; Daughter   Hypertension Mother/   --    Family history of certain chronic disabling diseases; arthritis Sister/   Sister   Osteoporosis Mother/   Mother         Social History  Finding Status Start/Stop Quantity Notes   Alcohol Never --/-- --  02/26/2019 - 01/15/2019 -    Alcohol Use Never --/-- --  does not  drink   lives alone --  --/-- --  --    Recreational Drug Use Never --/-- --  no   Retired. --  --/-- --  --    Tobacco Never --/-- --  never smoker    --  --/-- --  --          Review of Systems  · Constitutional  o Denies  o : fever, fatigue, weight loss, weight gain  · Cardiovascular  o Denies  o : lower extremity edema, claudication, chest pressure, palpitations  · Respiratory  o Denies  o : shortness of breath, wheezing, cough, hemoptysis, dyspnea on exertion  · Gastrointestinal  o Denies  o : nausea, vomiting, diarrhea, constipation, abdominal pain  · Integument  o Admits  o : rash      Vitals  Date Time BP Position Site L\R Cuff Size HR RR TEMP (F) WT  HT  BMI kg/m2 BSA m2 O2 Sat HC       10/11/2019 11:50 /76 Sitting    83 - R  98 137lbs 6oz 5'   26.83 1.62 98 %    11/01/2019 10:40 /81 Sitting    87 - R   137lbs 8oz 5'   26.85 1.62 97 %    06/30/2020 11:08 /74 Sitting    79 - R  96.7 138lbs 8oz 5'   27.05 1.63 99 %          Physical Examination  · Constitutional  o Appearance  o : no acute distress, well-nourished  · Head and Face  o Head  o :   § Inspection  § : atraumatic, normocephalic  · Ears, Nose, Mouth and Throat  o Ears  o :   § External Ears  § : normal  o Nose  o :   § Intranasal Exam  § : nares patent  o Oral Cavity  o :   § Oral Mucosa  § : moist mucous membranes  · Respiratory  o Respiratory Effort  o : breathing comfortably, symmetric chest rise  o Auscultation of Lungs  o : clear to asculatation bilaterally, no wheezes, rales, or rhonchii  · Cardiovascular  o Heart  o :   § Auscultation of Heart  § : regular rate and rhythm, no murmurs, rubs, or gallops  o Peripheral Vascular System  o :   § Extremities  § : no edema  · Skin and Subcutaneous Tissue  o General Inspection  o : linear vesicular rash on forearms, L upper/proximal thigh  · Neurologic  o Mental Status Examination  o :   § Orientation  § : grossly oriented to person, place and time  o Gait and Station  o :    § Gait Screening  § : normal gait          Assessment  · Contact dermatitis     692.9/L25.9  Will treat with small dose of solumedrol due to patient's age and history of paranoid schizophrenia and risk of irritation with steroid. Will send in topical steroid cream which should have less systemic side effects. Discussed with patient to watch for hyperglycemia, hypertension, irritability or other side effects.     Problems Reconciled  Plan  · Orders  o IM/SQ - Injection Fee [facility] (12506) - - 06/30/2020  o ACO-39: Current medications updated and reviewed () - - 06/30/2020  o Solu-Medrol Injection 40mg (-6) - - 06/30/2020   Injection - Solu-Medrol 40 mg; Dose: 1 mL; Site: Left Hip; Route: intramuscular; Date: 06/30/2020 13:38:49; Exp: 07/01/2020; Lot: [id number]; Mfg: N/A; TradeName: N/A; Location: [facility]; Administered By: [facility]; Comment: pt tolerated well  · Medications  o Medications have been Reconciled  o Transition of Care or Provider Policy  · Instructions  o Take all medications as prescribed/directed.  o Patient was educated/instructed on their diagnosis, treatment and medications prior to discharge from the clinic today.  o Patient instructed to seek medical attention urgently for new or worsening symptoms.  o Call the office with any concerns or questions.  · Disposition  o Call or Return if symptoms worsen or persist.  o follow up as needed  o Medications sent electronically to pharmacy            Electronically Signed by: Riddhi Garrett PA-C -Author on June 30, 2020 01:54:24 PM

## 2021-05-13 NOTE — PROGRESS NOTES
Progress Note      Patient Name: Saba Galarza   Patient ID: [id number]   Sex: Female   YOB: 1935    Primary Care Provider: Tosin Bai PA-C   Referring Provider: Tosin Bai PA-C    Visit Date: August 6, 2020    Provider: Tosin Bai PA-C   Location: [facility]   Location Address: [address] [place] [address], [address]  [place], [place]  [id number]   Location Phone: [phone]          Chief Complaint  · 1 months f/u   · Paranoid Schizoprenia   · Hypothyroid  · Bowel incontinence      History Of Present Illness  Saba Galarza is a 84 year old /White female who presents for evaluation and treatment of:      1mo f/u     Paranoid schizophrenia- mood is doing well. She is still seeing psych- Dr. Howard  She states Dr. Howard is retiring in the fall and wants her to follow up with one of his colleges but she is not planning to do so. She states she has been with him many years and does not feel she needs any further tx or care once he is retired.  Pt states her lawn boy has been using a drone shaped like an airplane to look into her home  She states her kids have ''bugged her phone''  She states her kids have hurt her feelings but denies physical abuse or neglect.      Bowel incontinence- wants to do cologuard test instead of colonoscopy.   She has had blood in stool, it is red.   She does not want to get colonoscopy due to not wanting the procedure.     squamous cell carcinoma in situ- Appt with Derm. next Tuesday.     She has been having chest congestion and mucus the past 2 days.   She took Mucinex which resolved sx.   Denies dizziness, syncope, sob, resp distress, cough, sore throat, wheezing, fever  No sick contacts.  No sx when in office    Discussed pt mental health with her in office for 40 minutes.       Past Medical History  Disease Name Date Onset Notes   Anemia, Unspecified --  --    Arthritis --  --    CKD (chronic kidney disease) stage 3, GFR 30-59 ml/min 09/14/2016 --     DDD (degenerative disc disease), lumbar 01/15/2019 --    Depression --  --    Depression with anxiety --  --    Diabetes Mellitus, Type II: controlled --  --    Forgetfulness --  --    Hyperlipemia --  --    Hypertension --  --    Hypothyroidism --  --    Limb Swelling --  --    Low back pain 12/21/2015 --    Paranoid delusion --  --    Paranoid schizophrenia 05/19/2016 --    Primary osteoarthritis of both knees 10/06/2015 --    Primary osteoarthritis of left hip 01/15/2019 --    Seasonal allergies --  --    Stroke --  --    Stroke 2013 or 2014 --          Past Surgical History  Procedure Name Date Notes   Colonoscopy --  --    Dilation and Curettage 1980 --    Eye Implant --  yes   Tonsillectomy --  --          Medication List  Name Date Started Instructions   amlodipine 5 mg oral tablet 05/06/2019 take 1 tablet (5 mg) by oral route once daily for 90 days   Aspir-81 81 mg oral tablet,delayed release (DR/EC)  take 1 tablet (81 mg) by oral route once daily   atorvastatin 10 mg oral tablet 05/06/2019 take 1 tablet (10 mg) by oral route once daily at bedtime for 90 days   Calcium 500 + D 500 mg(1,250mg) -200 unit oral tablet 07/06/2020 take 1 tablet by oral route daily for 90 days   cetirizine 10 mg oral tablet 05/06/2019 take 1 tablet (10 mg) by oral route once daily for 90 days   Detrol LA 4 mg oral capsule,extended release 24hr 02/26/2019 take 1 capsule (4 mg) by oral route once daily for 90 days   levothyroxine 88 mcg oral tablet 07/06/2020 take 1 tablet (88 mcg) by oral route once daily for 90 days   methocarbamol 750 mg oral tablet 05/06/2019 take 1 tablet (750 mg) by oral route 3 times per day for 30 days as needed   Micardis 20 mg oral tablet 05/06/2019 take 1 tablet (20 mg) by oral route once daily for 90 days   potassium chloride 20 mEq oral tablet extended release 05/06/2019 take 1/2 tablet by mouth every day   triamcinolone acetonide 0.1 % topical cream 06/30/2020 apply a thin layer to the affected  area(s) by topical route 2 times per day   Vitamin D3 1,000 unit oral tablet  take 1 tablet by oral route daily         Allergy List  Allergen Name Date Reaction Notes   PENICILLINS --  --  --    SULFA (SULFONAMIDES) --  --  --        Allergies Reconciled  Family Medical History  Disease Name Relative/Age Notes   Breast Neoplasm, Malignant Sister/   --    Heart Disease Father/   Father  Father; Brother   Cancer, Unspecified Sister/   Sister   Diabetes, unspecified type Daughter/   Daughter  Father  Father; Daughter   Hypertension Mother/   --    Family history of certain chronic disabling diseases; arthritis Sister/   Sister   Osteoporosis Mother/   Mother         Social History  Finding Status Start/Stop Quantity Notes   Alcohol Never --/-- --  02/26/2019 - 01/15/2019 -    lives alone --  --/-- --  --    Recreational Drug Use Never --/-- --  no   Retired. --  --/-- --  --    Tobacco Never --/-- --  never smoker    --  --/-- --  --          Review of Systems  · Constitutional  o Denies  o : fever, fatigue, weight loss, weight gain  · HENT  o Admits  o : nasal congestion  · Cardiovascular  o Denies  o : lower extremity edema, claudication, chest pressure, palpitations  · Respiratory  o Denies  o : shortness of breath, wheezing, frequent cough, hemoptysis, dyspnea on exertion  · Gastrointestinal  o Denies  o : nausea, vomiting, diarrhea, constipation, abdominal pain      Vitals  Date Time BP Position Site L\R Cuff Size HR RR TEMP (F) WT  HT  BMI kg/m2 BSA m2 O2 Sat HC       07/06/2020 03:23 /76 Sitting    74 - R  97.1 136lbs 2oz 5'   26.58 1.62 97 %    08/06/2020 10:37 /70 Sitting    86 - R 16  136lbs 0oz 5'   26.56 1.62 100 %    08/06/2020 03:05 /74 Sitting    96 - R 15 98.2 137lbs 2oz 5'   26.78 1.62 97 %          Physical Examination  · Constitutional  o Appearance  o : no acute distress, well-nourished  · Head and Face  o Head  o :   § Inspection  § : atraumatic,  normocephalic  · Eyes  o Eyes  o : extraocular movements intact, no scleral icterus, no conjunctival injection  · Ears, Nose, Mouth and Throat  o Ears  o :   § External Ears  § : normal  § Otoscopic Examination  § : tympanic membrane appearance within normal limits bilaterally  o Nose  o :   § Intranasal Exam  § : nares patent  o Oral Cavity  o :   § Oral Mucosa  § : moist mucous membranes  o Throat  o :   § Oropharynx  § : no inflammation or lesions present, tonsils within normal limits  · Respiratory  o Respiratory Effort  o : breathing comfortably, symmetric chest rise  o Auscultation of Lungs  o : clear to asculatation bilaterally, no wheezes, rales, or rhonchii  · Cardiovascular  o Heart  o :   § Auscultation of Heart  § : regular rate and rhythm, no murmurs, rubs, or gallops  o Peripheral Vascular System  o :   § Extremities  § : no edema  · Lymphatic  o Neck  o : no lymphadenopathy present  o Supraclavicular Nodes  o : no supraclavicular nodes  · Skin and Subcutaneous Tissue  o General Inspection  o : no lesions present, no areas of discoloration, skin turgor normal  · Neurologic  o Mental Status Examination  o :   § Orientation  § : grossly oriented to person, place and time  o Gait and Station  o :   § Gait Screening  § : normal gait  · Psychiatric  o General  o : normal mood and affect          Assessment  · Screening for colon cancer     V76.51/Z12.11  · CKD (chronic kidney disease) stage 3, GFR 30-59 ml/min     585.3/N18.3  discussed CKD on recent labs, avoid nsaids and increase water intake. will monitor at labs in 3 months.  · Paranoid schizophrenia     295.30/F20.0  Encouraged pt to cont follow up with psychiatry for med mgmt. and counseling. Encouraged to consider medications. Strongly encouraged her to continue follow up after current psychiatrist retires.  · Hyperlipemia     272.4/E78.5  reviewed labs, cont statin and we will monitor fasting labs in 3 months at follow  up  · Hypertension     401.9/I10  stable  · Paranoid delusion     297.9/F22  Encouraged to call 911 or go to er if pt feels unsafe in home  · Bowel incontinence     787.60/R15.9  · Squamous cell carcinoma in situ     234.9/D09.9  keep follow up with derm  · Blood in stool     578.1/K92.1  discussed concern for blood in stool. Pt has seen gi, she understands risks of cancer or death and still declines colonoscopy. She would like cologaurd instead, order placed today but I educated pt that if she has any blood in the stool the cologaurd test is going to be positive which will indicate she needs colonoscopy, she wants to proceed with cologaurd.      Plan  · Orders  o Cologuard (89045) - V76.51/Z12.11 - 08/08/2020  o ACO-39: Current medications updated and reviewed () - - 08/06/2020  · Medications  o Medications have been Reconciled  o Transition of Care or Provider Policy  · Instructions  o Patient was educated/instructed on their diagnosis, treatment and medications prior to discharge from the clinic today.  · Disposition  o Call or Return if symptoms worsen or persist.  o Follow up in 3 months            Electronically Signed by: IDALIA Arevalo-BENITO -Author on August 8, 2020 11:59:04 AM  Electronically Co-signed by: Rhonda Clark MD -Reviewer on August 10, 2020 09:53:16 AM

## 2021-05-13 NOTE — PROGRESS NOTES
Progress Note      Patient Name: Saba Galarza   Patient ID: [id number]   Sex: Female   YOB: 1935    Primary Care Provider: Tosin Bai PA-C   Referring Provider: Tosin Bai PA-C    Visit Date: September 23, 2020    Provider: Tosin Bai PA-C   Location: [facility]   Location Address: [address] [place] [address], [address]  [place], [place]  [id number]   Location Phone: [phone]          Chief Complaint  · Wants to talk about getting scooter      History Of Present Illness  Saba Galarza is a 84 year old /White female who presents for evaluation and treatment of:      pt wants to talk about getting a scooter/wheel chair. She feels she needs extra assistance at times due to pain in buttock, lower back, and knee.   She states she has a hard time getting up from a sitting position at times.   At times she has pain in buttock bone and requires sitting down to improve pain.   denies falls, trauma, injury, accidents.   Denies incontinence, saddle anesthesia.     L knee pain: [facility] in [place]   She states she had xr done recently.   She hast tried PT in the past which was very helpful for knee pain and back pain.          Past Medical History  Disease Name Date Onset Notes   Anemia, Unspecified --  --    Arthritis --  --    CKD (chronic kidney disease) stage 3, GFR 30-59 ml/min 09/14/2016 --    DDD (degenerative disc disease), lumbar 01/15/2019 --    Depression --  --    Depression with anxiety --  --    Diabetes Mellitus, Type II: controlled --  --    Forgetfulness --  --    Hyperlipemia --  --    Hypertension --  --    Hypothyroidism --  --    Limb Swelling --  --    Low back pain 12/21/2015 --    Paranoid delusion --  --    Paranoid schizophrenia 05/19/2016 --    Primary osteoarthritis of both knees 10/06/2015 --    Primary osteoarthritis of left hip 01/15/2019 --    Seasonal allergies --  --    Stroke --  --    Stroke 2013 or 2014 --           Past Surgical History  Procedure Name Date Notes   Colonoscopy --  --    Dilation and Curettage 1980 --    Eye Implant --  yes   Tonsillectomy --  --          Medication List  Name Date Started Instructions   amlodipine 5 mg oral tablet 05/06/2019 take 1 tablet (5 mg) by oral route once daily for 90 days   Aspir-81 81 mg oral tablet,delayed release (DR/EC)  take 1 tablet (81 mg) by oral route once daily   atorvastatin 10 mg oral tablet 05/06/2019 take 1 tablet (10 mg) by oral route once daily at bedtime for 90 days   cetirizine 10 mg oral tablet 05/06/2019 take 1 tablet (10 mg) by oral route once daily for 90 days   Detrol LA 4 mg oral capsule,extended release 24hr 02/26/2019 take 1 capsule (4 mg) by oral route once daily for 90 days   levothyroxine 88 mcg oral tablet 07/06/2020 take 1 tablet (88 mcg) by oral route once daily for 90 days   methocarbamol 750 mg oral tablet 05/06/2019 take 1 tablet (750 mg) by oral route 3 times per day for 30 days as needed   Micardis 20 mg oral tablet 05/06/2019 take 1 tablet (20 mg) by oral route once daily for 90 days   potassium chloride 20 mEq oral tablet extended release 05/06/2019 take 1/2 tablet by mouth every day   triamcinolone acetonide 0.1 % topical cream 08/11/2020 apply a thin layer to the affected area(s) by topical route 2 times per day   Vitamin D3 1,000 unit oral tablet  take 1 tablet by oral route daily         Allergy List  Allergen Name Date Reaction Notes   PENICILLINS --  --  --    SULFA (SULFONAMIDES) --  --  --        Allergies Reconciled  Family Medical History  Disease Name Relative/Age Notes   Breast Neoplasm, Malignant Sister/   --    Heart Disease Father/   Father  Father; Brother   Cancer, Unspecified Sister/   Sister   Diabetes, unspecified type Daughter/   Daughter  Father  Father; Daughter   Hypertension Mother/   --    Family history of certain chronic disabling diseases; arthritis Sister/   Sister   Osteoporosis Mother/   Mother  "        Social History  Finding Status Start/Stop Quantity Notes   Alcohol Never --/-- --  02/26/2019 - 01/15/2019 -    lives alone --  --/-- --  --    Recreational Drug Use Never --/-- --  no   Retired. --  --/-- --  --    Tobacco Never --/-- --  never smoker    --  --/-- --  --          Review of Systems  · Constitutional  o Denies  o : fever, fatigue, weight loss, weight gain  · Cardiovascular  o Denies  o : lower extremity edema, claudication, chest pressure, palpitations  · Respiratory  o Denies  o : shortness of breath, wheezing, cough, hemoptysis, dyspnea on exertion  · Gastrointestinal  o Denies  o : nausea, vomiting, diarrhea, constipation, abdominal pain      Vitals  Date Time BP Position Site L\R Cuff Size HR RR TEMP (F) WT  HT  BMI kg/m2 BSA m2 O2 Sat HC       08/06/2020 10:37 /70 Sitting    86 - R 16  136lbs 0oz 5'   26.56 1.62 100 %    08/06/2020 03:05 /74 Sitting    96 - R 15 98.2 137lbs 2oz 5'   26.78 1.62 97 %    09/23/2020 02:04 /74 Sitting    80 - R  98.6 137lbs 4oz 5'  2\" 25.1 1.65 97 %          Physical Examination  · Constitutional  o Appearance  o : no acute distress, well-nourished  · Head and Face  o Head  o :   § Inspection  § : atraumatic, normocephalic  · Ears, Nose, Mouth and Throat  o Ears  o :   § External Ears  § : normal  o Nose  o :   § Intranasal Exam  § : nares patent  o Oral Cavity  o :   § Oral Mucosa  § : moist mucous membranes  · Respiratory  o Respiratory Effort  o : breathing comfortably, symmetric chest rise  o Auscultation of Lungs  o : clear to asculatation bilaterally, no wheezes, rales, or rhonchii  · Cardiovascular  o Heart  o :   § Auscultation of Heart  § : regular rate and rhythm, no murmurs, rubs, or gallops  o Peripheral Vascular System  o :   § Extremities  § : no edema  · Neurologic  o Mental Status Examination  o :   § Orientation  § : grossly oriented to person, place and time  o Gait and Station  o :   § Gait Screening  § : normal " gait          Assessment  · DDD (degenerative disc disease), lumbar     722.52/M51.36  · Low back pain     724.2/M54.5  Discussed ddx lbp, will refer back to PT as this was helpful in the past. Will get walker with seat so that pt can sit to take breaks. Discussed that if therapy rec she gets a wheelchair we can discuss further but do not feel she is candidate or needs wheelchair at this time.  · Primary osteoarthritis of left hip     715.15/M16.12  Requesting records and imaging from ortho. Will restart PT as this was helpful before. Discussed fall prevention.   · Arthritis     716.90/M19.90  · Weakness     780.79/R53.1  Discussed weakness and need to start PT to help with strengthening and balance to prevent falls. Pt agrees with plan.    Problems Reconciled  Plan  · Orders  o ACO-39: Current medications updated and reviewed () - - 09/23/2020  o PHYSICAL THERAPY CONSULTATION (PHYTH) - 722.52/M51.36, 724.2/M54.5, 715.15/M16.12, 716.90/M19.90 - 09/23/2020   Pt would like PTA  · Medications  o Medications have been Reconciled  o Transition of Care or Provider Policy  · Instructions  o Handouts were given to patient: walker information  o Patient was educated/instructed on their diagnosis, treatment and medications prior to discharge from the clinic today.  o Electronically Identified Patient Education Materials Provided Electronically  · Disposition  o Call or Return if symptoms worsen or persist.  o Keep follow up appt as scheduled  o Care Transition  o MONICA Sent            Electronically Signed by: IDALIA Arevalo-BENITO -Author on September 23, 2020 03:24:19 PM  Electronically Co-signed by: Rhonda Clark MD -Reviewer on September 23, 2020 03:45:54 PM

## 2021-05-14 VITALS
TEMPERATURE: 98 F | DIASTOLIC BLOOD PRESSURE: 90 MMHG | OXYGEN SATURATION: 96 % | WEIGHT: 138.12 LBS | HEIGHT: 60 IN | RESPIRATION RATE: 15 BRPM | SYSTOLIC BLOOD PRESSURE: 140 MMHG | HEART RATE: 101 BPM | BODY MASS INDEX: 27.12 KG/M2

## 2021-05-14 VITALS
SYSTOLIC BLOOD PRESSURE: 136 MMHG | BODY MASS INDEX: 26.75 KG/M2 | TEMPERATURE: 97.2 F | WEIGHT: 136.25 LBS | DIASTOLIC BLOOD PRESSURE: 80 MMHG | OXYGEN SATURATION: 97 % | HEIGHT: 60 IN | HEART RATE: 80 BPM

## 2021-05-14 VITALS
HEIGHT: 62 IN | HEART RATE: 80 BPM | BODY MASS INDEX: 25.26 KG/M2 | OXYGEN SATURATION: 97 % | DIASTOLIC BLOOD PRESSURE: 74 MMHG | WEIGHT: 137.25 LBS | TEMPERATURE: 98.6 F | SYSTOLIC BLOOD PRESSURE: 128 MMHG

## 2021-05-14 NOTE — PROGRESS NOTES
Progress Note      Patient Name: Saba Galarza   Patient ID: [id number]   Sex: Female   YOB: 1935    Primary Care Provider: Julee Mcmahon MD   Referring Provider: Julee Mcmahon MD    Visit Date: February 12, 2021    Provider: Julee Mcmahon MD   Location: [facility]   Location Address: [address] [place] [address]  [id number]   Location Phone: [phone]          Chief Complaint  · Follow up  · CKD  · Arthritis  · DM2      History Of Present Illness  Saba Galarza is a 85 year old /White female who presents for evaluation and treatment of:      Patient with CKD, , type 2 diabetes mellitus, hyperlipidemia, hypertension, hypothyroidism and depression presenting for follow up.  She was last seen in our office in September 2020.  She is presenting to establish care with alternative provider in the office.  Of note patient does have a history of psychiatric disorders with delusions as well as a history of memory issues.  She is not very forthcoming regarding her psychiatric care and is unwilling to discuss her exact diagnoses and prior treatment plan.    Although today's visit was scheduled to follow-up on her medical conditions a significant portion of the encounter was spent attempting to establish rapport with the patient as again she continues to discuss prior perceived breeched of privacy regarding our clinic's process of requesting outside records for all of our new patients including records of psychiatric care.      Schizophrenia:  history of Delusions with paranoia:   States that she does not trust her kids.   She refusing to sign consent to treat form, she 'thinks' her children were able to access her records, although she does not have proof of this. She is also concern that they may be going through her things at home.       Depression:   Sees Dr. Howard. She is on Effexor 25mg, was previously on 37.5mg. Has some nausea with .. Last seen   Lee on 2/03/21  Endorses insomnia, which sometimes result to her forgetting to take her pills. This typically occurs about 3x a month.    HLD, CKD, HLD, HTN and type 2DM:   She is due for labs.   Presumed stable as again we were unable to discuss them during today's visit.   A1C of 5.9 in July.       Past Medical History  Disease Name Date Onset Notes   Anemia, Unspecified --  --    Arthritis --  --    CKD (chronic kidney disease) stage 3, GFR 30-59 ml/min 09/14/2016 --    DDD (degenerative disc disease), lumbar 01/15/2019 --    Depression with anxiety --  --    Diabetes Mellitus, Type II: controlled --  --    Forgetfulness --  --    Hyperlipemia --  --    Hypertension --  --    Hypothyroidism --  --    Limb Swelling --  --    Low back pain 12/21/2015 --    Paranoid delusion --  --    Paranoid schizophrenia 05/19/2016 --    Primary osteoarthritis of both knees 10/06/2015 --    Primary osteoarthritis of left hip 01/15/2019 --    Seasonal allergies --  --    Stroke --  --    Stroke 2013 or 2014 --          Past Surgical History  Procedure Name Date Notes   Colonoscopy --  --    Dilation and Curettage 1980 --    Eye Implant --  yes   Tonsillectomy --  --          Medication List  Name Date Started Instructions   Adult Low Dose Aspirin 81 mg oral tablet,delayed release (DR/EC) 02/12/2021 take 1 tablet (81 mg) by oral route once daily   amlodipine 5 mg oral tablet 02/12/2021 take 1 tablet (5 mg) by oral route once daily for 90 days   atorvastatin 10 mg oral tablet 02/12/2021 take 1 tablet (10 mg) by oral route once daily at bedtime for 90 days   cetirizine 10 mg oral tablet 02/12/2021 take 1 tablet (10 mg) by oral route once daily for 90 days   Detrol LA 4 mg oral capsule,extended release 24hr 02/12/2021 take 1 capsule (4 mg) by oral route once daily for 90 days   levothyroxine 88 mcg oral tablet 02/12/2021 take 1 tablet (88 mcg) by oral route once daily for 90 days   methocarbamol 750 mg oral tablet 02/12/2021 take 1  tablet (750 mg) by oral route 3 times per day for 30 days as needed   Micardis 20 mg oral tablet 02/12/2021 take 1 tablet (20 mg) by oral route once daily for 90 days   potassium chloride 20 mEq oral tablet extended release 02/12/2021 take 1/2 tablet by mouth every day   triamcinolone acetonide 0.1 % topical cream 02/12/2021 apply a thin layer to the affected area(s) by topical route 2 times per day   Vitamin D3 1,000 unit oral tablet  take 1 tablet by oral route daily         Allergy List  Allergen Name Date Reaction Notes   PENICILLINS --  --  --    SULFA (SULFONAMIDES) --  --  --          Family Medical History  Disease Name Relative/Age Notes   Breast Neoplasm, Malignant Sister/   --    Heart Disease Father/   Father  Father; Brother   Cancer, Unspecified Sister/   Sister   Diabetes, unspecified type Daughter/   Daughter  Father  Father; Daughter   Hypertension Mother/   --    Family history of certain chronic disabling diseases; arthritis Sister/   Sister   Osteoporosis Mother/   Mother         Social History  Finding Status Start/Stop Quantity Notes   Alcohol Never --/-- --  02/26/2019 - 01/15/2019 -    lives alone --  --/-- --  --    Recreational Drug Use Never --/-- --  no   Retired --  --/-- --  --    Tobacco Never --/-- --  never smoker    --  --/-- --  --          Review of Systems  · Constitutional  o Denies  o : fever, fatigue, weight loss, weight gain  · Cardiovascular  o Denies  o : lower extremity edema, claudication, chest pressure, palpitations  · Respiratory  o Denies  o : shortness of breath, wheezing, frequent cough, hemoptysis, dyspnea on exertion  · Gastrointestinal  o Denies  o : nausea, vomiting, diarrhea, constipation, abdominal pain      Vitals  Date Time BP Position Site L\R Cuff Size HR RR TEMP (F) WT  HT  BMI kg/m2 BSA m2 O2 Sat FR L/min FiO2 HC       08/06/2020 03:05 /74 Sitting    96 - R 15 98.2 137lbs 2oz 5'   26.78 1.62 97 %      09/23/2020 02:04 /74 Sitting     "80 - R  98.6 137lbs 4oz 5'  2\" 25.1 1.65 97 %  21%    02/12/2021 08:28 /80 Sitting    80 - R  97.2 136lbs 4oz 5'   26.61 1.62 97 %  21%          Physical Examination  · Constitutional  o Appearance  o : no acute distress, well-nourished  · Head and Face  o Head  o :   § Inspection  § : atraumatic, normocephalic  · Ears, Nose, Mouth and Throat  o Ears  o :   § External Ears  § : normal  o Nose  o :   § Intranasal Exam  § : nares patent  o Oral Cavity  o :   § Oral Mucosa  § : moist mucous membranes  · Respiratory  o Respiratory Effort  o : breathing comfortably, symmetric chest rise  o Auscultation of Lungs  o : clear to asculatation bilaterally, no wheezes, rales, or rhonchii  · Cardiovascular  o Heart  o :   § Auscultation of Heart  § : regular rate and rhythm, no murmurs, rubs, or gallops  o Peripheral Vascular System  o :   § Extremities  § : no edema  · Gastrointestinal  o Abdominal Examination  o : abdomen nontender to palpation, normal bowel sounds, tone normal without rigidity or guarding, no masses present, abdomen scaphoid upon supine  · Skin and Subcutaneous Tissue  o General Inspection  o : no lesions present, no areas of discoloration, skin turgor normal  · Neurologic  o Mental Status Examination  o :   § Orientation  § : grossly oriented to person, place and time  o Cranial Nerves  o : crainial nerves 2-12 grossly intact  o Gait and Station  o :   § Gait Screening  § : normal gait     Psych: Appropriately interactive with provider however appears skeptical.  Normal mood and affect.  She does not appear to be answering to any internal stimuli.  She does have a tendency to perseverate over this perceived breach of privacy.  Will reviewing [facility] provider happen to mention her that medical history of schizophrenia and again patient with questions regarding how we came to know this information.           Assessment  · CKD (chronic kidney disease) stage 3, GFR 30-59 ml/min     585.3/N18.30  Chronic and " stable. Due for lab  · Paranoid schizophrenia     295.30/F20.0  Chronic and presumed stable. Her paranoia has made it difficult for her to establish report with providers in our office. Today is provider's first time meeting the patient and a substantial amount of time was spent in trying to establish rapport and trust, as well discussing the need to have signed consent for treatment. Our clinic manager was called into the visit to discuss her concern of a potential breech in privacy and the need to have a signed consent in order for us to treat her.   · Hyperlipemia     272.4/E78.5  Chronic. Due for labs  · Hypertension     401.9/I10  Chronic and in good control.  · Diabetes Mellitus, Type II: controlled     250.00/E11.9  Chronic and well controlled. Due for labs  · Depression with anxiety     300.4/F41.8  PHQ-9 of 19 she follows with psychiatry. Patient states she has an appointment with Dr. Howard who is her psychiatrist next month.  · Screening for depression     V79.0/Z13.89  Positive screen and patient with known depression.    Problems Reconciled  Plan  · Orders  o Annual depression screening, 15 minutes (, 17926) - V79.0/Z13.89 - 05/02/2021  o ACO-18: Positive screen for clinical depression using a standardized tool and a follow-up plan documented () - V79.0/Z13.89 - 02/12/2021  o Physical, Primary Care Panel (CBC, CMP, Lipid, TSH) [facility] (52841, 94549, 04310, 81663) - 585.3/N18.30, 272.4/E78.5, 401.9/I10, 250.00/E11.9 - 02/12/2021  o Vitamin D (25-Hydroxy) Level (77365) - 585.3/N18.30, 272.4/E78.5, 401.9/I10, 250.00/E11.9 - 02/12/2021  o ACO-39: Current medications updated and reviewed (1159F, ) - 401.9/I10, 250.00/E11.9, 300.4/F41.8, 295.30/F20.0 - 02/12/2021  · Medications  o Adult Low Dose Aspirin 81 mg oral tablet,delayed release (DR/EC)   SIG: take 1 tablet (81 mg) by oral route once daily   DISP: (90) Tablet with 0 refills  Adjusted on 02/12/2021     o amlodipine 5 mg oral tablet   SIG:  take 1 tablet (5 mg) by oral route once daily for 90 days   DISP: (90) Tablet with 1 refills  Refilled on 02/12/2021     o atorvastatin 10 mg oral tablet   SIG: take 1 tablet (10 mg) by oral route once daily at bedtime for 90 days   DISP: (90) Tablet with 1 refills  Refilled on 02/12/2021     o cetirizine 10 mg oral tablet   SIG: take 1 tablet (10 mg) by oral route once daily for 90 days   DISP: (90) Tablet with 1 refills  Refilled on 02/12/2021     o Detrol LA 4 mg oral capsule,extended release 24hr   SIG: take 1 capsule (4 mg) by oral route once daily for 90 days   DISP: (90) Capsule with 1 refills  Refilled on 02/12/2021     o levothyroxine 88 mcg oral tablet   SIG: take 1 tablet (88 mcg) by oral route once daily for 90 days   DISP: (90) Tablet with 0 refills  Refilled on 02/12/2021     o methocarbamol 750 mg oral tablet   SIG: take 1 tablet (750 mg) by oral route 3 times per day for 30 days as needed   DISP: (90) Tablet with 0 refills  Refilled on 02/12/2021     o Micardis 20 mg oral tablet   SIG: take 1 tablet (20 mg) by oral route once daily for 90 days   DISP: (90) Tablet with 1 refills  Refilled on 02/12/2021     o potassium chloride 20 mEq oral tablet extended release   SIG: take 1/2 tablet by mouth every day   DISP: (45) Tablet with 1 refills  Refilled on 02/12/2021     o triamcinolone acetonide 0.1 % topical cream   SIG: apply a thin layer to the affected area(s) by topical route 2 times per day   DISP: (1) Tube with 0 refills  Refilled on 02/12/2021     o Medications have been Reconciled  o Transition of Care or Provider Policy  · Instructions  o Depression Screen completed and scanned into the EMR under the designated folder within the patient's documents.  o Today's PHQ-9 result is _19__  o The provider screening met the required time of 15 minutes.  o Patient was educated/instructed on their diagnosis, treatment and medications prior to discharge from the clinic today.  o Call the office with any  concerns or questions.  o Time spent with the patient was at least 40 minutes, more than 75% face to face.  · Disposition  o Call or Return if symptoms worsen or persist.            Electronically Signed by: Julee Mcmahon MD -Author on May 2, 2021 09:44:33 AM

## 2021-05-15 VITALS
DIASTOLIC BLOOD PRESSURE: 74 MMHG | HEART RATE: 79 BPM | WEIGHT: 138.5 LBS | SYSTOLIC BLOOD PRESSURE: 120 MMHG | BODY MASS INDEX: 27.19 KG/M2 | HEIGHT: 60 IN | TEMPERATURE: 96.7 F | OXYGEN SATURATION: 99 %

## 2021-05-15 VITALS
HEART RATE: 74 BPM | TEMPERATURE: 97.1 F | HEIGHT: 60 IN | WEIGHT: 136.12 LBS | DIASTOLIC BLOOD PRESSURE: 76 MMHG | BODY MASS INDEX: 26.72 KG/M2 | SYSTOLIC BLOOD PRESSURE: 118 MMHG | OXYGEN SATURATION: 97 %

## 2021-05-15 VITALS
BODY MASS INDEX: 26.97 KG/M2 | TEMPERATURE: 98 F | HEART RATE: 83 BPM | OXYGEN SATURATION: 98 % | WEIGHT: 137.37 LBS | SYSTOLIC BLOOD PRESSURE: 122 MMHG | HEIGHT: 60 IN | DIASTOLIC BLOOD PRESSURE: 76 MMHG

## 2021-05-15 VITALS
BODY MASS INDEX: 27.36 KG/M2 | WEIGHT: 139.37 LBS | OXYGEN SATURATION: 100 % | HEART RATE: 67 BPM | SYSTOLIC BLOOD PRESSURE: 160 MMHG | HEIGHT: 60 IN | TEMPERATURE: 97.6 F | DIASTOLIC BLOOD PRESSURE: 64 MMHG

## 2021-05-15 VITALS
BODY MASS INDEX: 27.88 KG/M2 | WEIGHT: 142 LBS | SYSTOLIC BLOOD PRESSURE: 152 MMHG | HEIGHT: 60 IN | DIASTOLIC BLOOD PRESSURE: 75 MMHG | HEART RATE: 82 BPM

## 2021-05-15 VITALS
DIASTOLIC BLOOD PRESSURE: 81 MMHG | SYSTOLIC BLOOD PRESSURE: 135 MMHG | BODY MASS INDEX: 27 KG/M2 | OXYGEN SATURATION: 97 % | HEART RATE: 87 BPM | HEIGHT: 60 IN | WEIGHT: 137.5 LBS

## 2021-05-15 VITALS
BODY MASS INDEX: 26.7 KG/M2 | RESPIRATION RATE: 16 BRPM | OXYGEN SATURATION: 100 % | HEIGHT: 60 IN | WEIGHT: 136 LBS | SYSTOLIC BLOOD PRESSURE: 136 MMHG | HEART RATE: 86 BPM | DIASTOLIC BLOOD PRESSURE: 70 MMHG

## 2021-05-15 VITALS
DIASTOLIC BLOOD PRESSURE: 61 MMHG | HEIGHT: 60 IN | WEIGHT: 141 LBS | HEART RATE: 67 BPM | SYSTOLIC BLOOD PRESSURE: 157 MMHG | BODY MASS INDEX: 27.68 KG/M2

## 2021-05-15 VITALS
RESPIRATION RATE: 15 BRPM | TEMPERATURE: 98.2 F | HEIGHT: 60 IN | BODY MASS INDEX: 26.92 KG/M2 | DIASTOLIC BLOOD PRESSURE: 74 MMHG | OXYGEN SATURATION: 97 % | HEART RATE: 96 BPM | SYSTOLIC BLOOD PRESSURE: 138 MMHG | WEIGHT: 137.12 LBS

## 2021-05-16 VITALS
TEMPERATURE: 98.2 F | OXYGEN SATURATION: 97 % | HEART RATE: 76 BPM | SYSTOLIC BLOOD PRESSURE: 142 MMHG | DIASTOLIC BLOOD PRESSURE: 70 MMHG | RESPIRATION RATE: 19 BRPM | HEIGHT: 60 IN | BODY MASS INDEX: 27.09 KG/M2 | WEIGHT: 138 LBS

## 2021-05-16 VITALS — HEIGHT: 60 IN | HEART RATE: 98 BPM | OXYGEN SATURATION: 98 % | WEIGHT: 145 LBS | BODY MASS INDEX: 28.47 KG/M2

## 2021-05-16 VITALS
SYSTOLIC BLOOD PRESSURE: 143 MMHG | BODY MASS INDEX: 28.07 KG/M2 | HEIGHT: 60 IN | HEART RATE: 79 BPM | DIASTOLIC BLOOD PRESSURE: 69 MMHG | WEIGHT: 143 LBS

## 2021-05-16 VITALS
HEIGHT: 60 IN | RESPIRATION RATE: 16 BRPM | DIASTOLIC BLOOD PRESSURE: 84 MMHG | OXYGEN SATURATION: 99 % | TEMPERATURE: 98.3 F | WEIGHT: 142.12 LBS | HEART RATE: 88 BPM | SYSTOLIC BLOOD PRESSURE: 153 MMHG | BODY MASS INDEX: 27.9 KG/M2

## 2021-05-16 VITALS
HEART RATE: 82 BPM | SYSTOLIC BLOOD PRESSURE: 138 MMHG | TEMPERATURE: 98.4 F | WEIGHT: 142.12 LBS | DIASTOLIC BLOOD PRESSURE: 69 MMHG | HEIGHT: 60 IN | BODY MASS INDEX: 27.9 KG/M2 | OXYGEN SATURATION: 97 %

## 2021-05-16 VITALS
HEIGHT: 60 IN | HEART RATE: 75 BPM | TEMPERATURE: 97.7 F | SYSTOLIC BLOOD PRESSURE: 149 MMHG | DIASTOLIC BLOOD PRESSURE: 75 MMHG | OXYGEN SATURATION: 97 % | BODY MASS INDEX: 27.88 KG/M2 | WEIGHT: 142 LBS

## 2021-05-16 VITALS
SYSTOLIC BLOOD PRESSURE: 128 MMHG | WEIGHT: 142 LBS | HEIGHT: 60 IN | OXYGEN SATURATION: 94 % | HEART RATE: 83 BPM | DIASTOLIC BLOOD PRESSURE: 73 MMHG | BODY MASS INDEX: 27.88 KG/M2

## 2021-06-13 ENCOUNTER — HOSPITAL ENCOUNTER (EMERGENCY)
Facility: HOSPITAL | Age: 86
Discharge: HOME | End: 2021-06-13
Attending: EMERGENCY MEDICINE | Admitting: EMERGENCY MEDICINE

## 2021-06-13 VITALS
DIASTOLIC BLOOD PRESSURE: 78 MMHG | HEART RATE: 90 BPM | BODY MASS INDEX: 26.06 KG/M2 | RESPIRATION RATE: 16 BRPM | TEMPERATURE: 98.1 F | HEIGHT: 60 IN | SYSTOLIC BLOOD PRESSURE: 149 MMHG | OXYGEN SATURATION: 96 % | WEIGHT: 132.72 LBS

## 2021-06-13 DIAGNOSIS — S63.502A SPRAIN OF LEFT WRIST, INITIAL ENCOUNTER: Primary | ICD-10-CM

## 2021-06-13 PROCEDURE — 99283 EMERGENCY DEPT VISIT LOW MDM: CPT

## 2021-06-13 RX ORDER — PREDNISONE 20 MG/1
20 TABLET ORAL DAILY
Qty: 5 TABLET | Refills: 0 | Status: SHIPPED | OUTPATIENT
Start: 2021-06-13 | End: 2022-04-11

## 2021-06-13 NOTE — ED PROVIDER NOTES
Subjective   Pt here today for left wrist and left hand pain s/p falling yesterday. Pt was seen at another facility yesterday and treated with a splint. Pt contains to complain of pain and swelling.       History provided by:  Patient   used: No    Fall  Mechanism of injury: fall    Injury location:  Hand  Hand injury location:  L wrist and L hand  Time since incident:  1 day  Fall:     Height of fall:  Standing  Associated symptoms: no abdominal pain, no chest pain, no headaches, no nausea, no seizures and no vomiting    Wrist Injury  Associated symptoms: no fever        Review of Systems   Constitutional: Negative for chills and fever.   HENT: Negative for congestion, ear pain and sore throat.    Eyes: Negative for pain.   Respiratory: Negative for cough, chest tightness and shortness of breath.    Cardiovascular: Negative for chest pain.   Gastrointestinal: Negative for abdominal pain, diarrhea, nausea and vomiting.   Genitourinary: Negative for flank pain and hematuria.   Musculoskeletal: Negative for joint swelling.   Skin: Negative for pallor.   Neurological: Negative for seizures and headaches.   All other systems reviewed and are negative.      Past Medical History:   Diagnosis Date   • Arthritis    • CVA (cerebral vascular accident) (CMS/HCC)    • Depression    • Disease of thyroid gland    • GERD (gastroesophageal reflux disease)    • Hyperlipidemia    • Hypertension    • Stroke (CMS/[facility])        Allergies   Allergen Reactions   • Penicillins GI Intolerance   • Sulfa Antibiotics GI Intolerance       Past Surgical History:   Procedure Laterality Date   • DILATATION AND CURETTAGE         History reviewed. No pertinent family history.    Social History     Socioeconomic History   • Marital status:      Spouse name: Not on file   • Number of children: Not on file   • Years of education: Not on file   • Highest education level: Not on file   Tobacco Use   • Smoking status: Never Smoker    • Smokeless tobacco: Never Used   Vaping Use   • Vaping Use: Never used   Substance and Sexual Activity   • Alcohol use: Never   • Drug use: Never   • Sexual activity: Defer           Objective   Physical Exam  Vitals and nursing note reviewed.   Constitutional:       General: She is not in acute distress.     Appearance: Normal appearance. She is not toxic-appearing.   HENT:      Head: Normocephalic and atraumatic.      Mouth/Throat:      Mouth: Mucous membranes are moist.   Eyes:      Extraocular Movements: Extraocular movements intact.      Pupils: Pupils are equal, round, and reactive to light.   Cardiovascular:      Rate and Rhythm: Normal rate and regular rhythm.      Pulses: Normal pulses.      Heart sounds: Normal heart sounds.   Pulmonary:      Effort: Pulmonary effort is normal. No respiratory distress.      Breath sounds: Normal breath sounds.   Abdominal:      General: Abdomen is flat.      Palpations: Abdomen is soft.      Tenderness: There is no abdominal tenderness.   Musculoskeletal:         General: Normal range of motion.      Cervical back: Normal range of motion and neck supple.      Comments: Swelling and bruising present    Skin:     General: Skin is warm and dry.      Capillary Refill: Capillary refill takes less than 2 seconds.   Neurological:      Mental Status: She is alert and oriented to person, place, and time. Mental status is at baseline.         Procedures           ED Course                                           MDM  Number of Diagnoses or Management Options  Sprain of left wrist, initial encounter  Diagnosis management comments: Pt will provide patient Ace bandage for comfort and swelling.  Will provide a low dose of prednisone to aid with swelling and patient comfort.  Instructed patient to continue acetaminophen as directed.  And will provide orthopedic follow-up.       Amount and/or Complexity of Data Reviewed  Tests in the radiology section of CPT®: reviewed (Reviewed hand  and wrist x-rays taken at urgent care yesterday.  No acute findings.)    Risk of Complications, Morbidity, and/or Mortality  Presenting problems: low  Diagnostic procedures: low  Management options: low    Patient Progress  Patient progress: stable      Final diagnoses:   Sprain of left wrist, initial encounter       ED Disposition  ED Disposition     ED Disposition Condition Comment    Discharge Stable           BeenSander MD  [address]  [place] [address] [phone]    Schedule an appointment as soon as possible for a visit in 3 days  If symptoms worsen         Medication List      New Prescriptions    predniSONE 20 MG tablet  Commonly known as: DELTASONE  Take 1 tablet by mouth Daily.           Where to Get Your Medications      These medications were sent to [id number] - [place] - [address] AT [facility] [address] & [place] - [phone] [facility] [phone]   [address] [place] [address]    Phone: [phone]   · predniSONE 20 MG tablet          Marvin Giron, RADHA  06/13/21 1071

## 2021-06-15 ENCOUNTER — TELEPHONE (OUTPATIENT)
Dept: ORTHOPEDIC SURGERY | Facility: CLINIC | Age: 86
End: 2021-06-15

## 2021-06-23 ENCOUNTER — OFFICE VISIT (OUTPATIENT)
Dept: ORTHOPEDIC SURGERY | Facility: CLINIC | Age: 86
End: 2021-06-23

## 2021-06-23 VITALS — HEIGHT: 60 IN | HEART RATE: 69 BPM | BODY MASS INDEX: 26.5 KG/M2 | WEIGHT: 135 LBS | OXYGEN SATURATION: 95 %

## 2021-06-23 DIAGNOSIS — M19.042 ARTHRITIS OF LEFT HAND: Primary | ICD-10-CM

## 2021-06-23 DIAGNOSIS — M79.642 LEFT HAND PAIN: ICD-10-CM

## 2021-06-23 DIAGNOSIS — M25.532 LEFT WRIST PAIN: ICD-10-CM

## 2021-06-23 DIAGNOSIS — M19.032 ARTHRITIS OF LEFT WRIST: ICD-10-CM

## 2021-06-23 PROCEDURE — 99203 OFFICE O/P NEW LOW 30 MIN: CPT | Performed by: STUDENT IN AN ORGANIZED HEALTH CARE EDUCATION/TRAINING PROGRAM

## 2021-06-23 RX ORDER — POTASSIUM CHLORIDE 20 MEQ/1
TABLET, EXTENDED RELEASE ORAL
COMMUNITY
Start: 2021-06-20 | End: 2022-09-16 | Stop reason: SDUPTHER

## 2021-06-23 NOTE — PROGRESS NOTES
"Chief Complaint  Pain of the Left Wrist and Pain of the Left Hand    Subjective          Saba Galarza presents to [facility] for   History of Present Illness    Ms. Galarza presents today for evaluation of her left hand and wrist.  She fell onto an outstretched left hand while transplanting trees approximately 2 weeks ago.  She experienced immediate wrist and hand pain.  She presented to the ED for evaluation.  X-rays revealed degenerative changes but no fractures.  She was prescribed prednisone for her pain.  She states this was very helpful.  She has a long history of arthritis in her wrist and hands.  She purchased an over-the-counter wrist brace which has been very helpful for her symptoms as well.  She denies any pain other than the left wrist and hand.  She localizes the pain to the base of the thumb and radial aspect of the wrist.  She denies any wounds.  She denies any numbness.  She is retired. She is right-hand dominant.  She has been taking Tylenol and aspirin as needed for her pain.    Allergies   Allergen Reactions   • Penicillins GI Intolerance   • Sulfa Antibiotics GI Intolerance        Social History     Socioeconomic History   • Marital status:      Spouse name: Not on file   • Number of children: Not on file   • Years of education: Not on file   • Highest education level: Not on file   Tobacco Use   • Smoking status: Never Smoker   • Smokeless tobacco: Never Used   Vaping Use   • Vaping Use: Never used   Substance and Sexual Activity   • Alcohol use: Never   • Drug use: Never   • Sexual activity: Defer        REVIEW OF SYSTEMS    Constitutional: Denies fevers, chills, weight loss  Cardiovascular: Denies chest pain, shortness of breath  Skin: Denies rashes, acute skin changes  Neurologic: Denies headache, loss of consciousness  MSK: Left hand/wrist pain      Objective   Vital Signs:   Pulse 69   Ht 152.4 cm (60\")   Wt 61.2 kg (135 lb)   SpO2 95%   BMI 26.37 kg/m²  "    Body mass index is 26.37 kg/m².    Physical Exam    General: Alert, no acute distress  Left upper extremity: Arthritic deformities about all 5 fingers.  No wounds.  Psoriatic type lesions over the lower arm.  No focal swelling.  Tender to palpation over the base of the thumb at the CMC joint and over the radial aspect of the carpus.  Wrist range of motion from 30 degrees of extension to 30 degrees of flexion with terminal pain.  Pain with CMC grind localizing to the base of the thumb.  Full finger extension.  Stiffness with finger flexion.  Unable to make a tight fist.  Sensation intact to light touch in the median, radial, ulnar nerve distributions.  Motor function otherwise intact in the AIN, PIN, ulnar nerve distributions.  Palpable radial pulse.    Procedures    Imaging Results (Most Recent)     Procedure Component Value Units Date/Time    XR Wrist 2 View Left [[id number]] Resulted: 06/24/21 0921     Updated: 06/24/21 0922    Narrative:      Indications: Left wrist pain    Views: AP and lateral left wrist    Findings: Left wrist x-rays obtained.  No fractures are seen.  Advanced   degenerative changes are again demonstrated in the STT and CMC   articulation at the base of the thumb.  DRUJ well aligned.    Comparative Data: Comparative data found and reviewed today      XR Hand 3+ View Left [[id number]] Resulted: 06/24/21 0915     Updated: 06/24/21 0921    Narrative:      Indications: Left hand pain    Views: AP, lateral, oblique left hand    Findings: No fractures visualized.  Joint alignment appropriate.    Arthritic changes involving the STT and CMC articulations with complete   loss of articular height, subchondral sclerosis, and cystic formation   seen.  Advanced arthritic changes are also seen in the PIP and DIP joints   diffusely.    Comparative Data: Comparative data found and reviewed today             Result Review :   The following data was reviewed by: Sanjay Schuler MD on 06/23/2021:  Data reviewed:  Radiologic studies X-rays left wrist and hand obtained today.             Assessment and Plan    Diagnoses and all orders for this visit:    1. Arthritis of left hand (Primary)    2. Arthritis of left wrist    3. Left wrist pain  -     XR Wrist 2 View Left  -     Diclofenac Sodium (Voltaren) 1 % gel gel; Apply 4 g topically to the appropriate area as directed 4 (Four) Times a Day.  Dispense: 200 g; Refill: 1    4. Left hand pain  -     XR Hand 3+ View Left  -     Diclofenac Sodium (Voltaren) 1 % gel gel; Apply 4 g topically to the appropriate area as directed 4 (Four) Times a Day.  Dispense: 200 g; Refill: 1      Ms. Galarza has wrist and hand arthritis as noted above.  This was aggravated by a fall 2 weeks ago.  No fractures are noted.  We discussed treatment options.  She may continue her wrist brace as needed.  We will provide her with a home exercise program for wrist and finger range of motion.  We will prescribe Voltaren gel as well.  I will follow up with her in 1 month for reevaluation.  No x-rays are needed when she returns.  We may consider formal physical therapy if not making progress.        Follow Up   Return in about 4 weeks (around 7/21/2021).  Patient was given instructions and counseling regarding her condition or for health maintenance advice. Please see specific information pulled into the AVS if appropriate.

## 2021-06-24 PROBLEM — M19.042 ARTHRITIS OF LEFT HAND: Status: ACTIVE | Noted: 2021-06-24

## 2021-06-24 PROBLEM — M19.032 ARTHRITIS OF LEFT WRIST: Status: ACTIVE | Noted: 2021-06-24

## 2021-07-26 ENCOUNTER — OFFICE VISIT (OUTPATIENT)
Dept: ORTHOPEDIC SURGERY | Facility: CLINIC | Age: 86
End: 2021-07-26

## 2021-07-26 VITALS — OXYGEN SATURATION: 97 % | HEIGHT: 68 IN | WEIGHT: 135 LBS | BODY MASS INDEX: 20.46 KG/M2 | HEART RATE: 107 BPM

## 2021-07-26 DIAGNOSIS — M25.532 LEFT WRIST PAIN: ICD-10-CM

## 2021-07-26 DIAGNOSIS — M79.642 LEFT HAND PAIN: ICD-10-CM

## 2021-07-26 DIAGNOSIS — M19.042 ARTHRITIS OF LEFT HAND: Primary | ICD-10-CM

## 2021-07-26 PROCEDURE — 99213 OFFICE O/P EST LOW 20 MIN: CPT | Performed by: STUDENT IN AN ORGANIZED HEALTH CARE EDUCATION/TRAINING PROGRAM

## 2021-07-26 RX ORDER — METHOCARBAMOL 500 MG/1
500 TABLET, FILM COATED ORAL 4 TIMES DAILY
COMMUNITY

## 2021-07-26 NOTE — PROGRESS NOTES
"Chief Complaint  Follow-up of the Left Wrist    Subjective          Saba Galarza presents to [facility] for   History of Present Illness    Saba is here for follow up of left wrist. She has wrist and hand arthritis. No fractures detected on previous xrays. Describes continued pain all over wrist but doing better. Was given voltarn gel last visit and tolerating well. States it is helping with the pain. No new falls.  She denies any numbness.  Allergies   Allergen Reactions   • Penicillins GI Intolerance   • Sulfa Antibiotics GI Intolerance        Social History     Socioeconomic History   • Marital status:      Spouse name: Not on file   • Number of children: Not on file   • Years of education: Not on file   • Highest education level: Not on file   Tobacco Use   • Smoking status: Never Smoker   • Smokeless tobacco: Never Used   Vaping Use   • Vaping Use: Never used   Substance and Sexual Activity   • Alcohol use: Never   • Drug use: Never   • Sexual activity: Defer        I reviewed the patient's chief complaint, history of present illness, review of systems, past medical history, surgical history, family history, social history, medications, and allergy list.     REVIEW OF SYSTEMS    Constitutional: Denies fevers, chills, weight loss  Cardiovascular: Denies chest pain, shortness of breath  Skin: Denies rashes, acute skin changes  Neurologic: Denies headache, loss of consciousness  MSK: follow up with left wrist       Objective   Vital Signs:   Pulse 107   Ht 172.7 cm (68\")   Wt 61.2 kg (135 lb)   SpO2 97%   BMI 20.53 kg/m²     Body mass index is 20.53 kg/m².    Physical Exam    General: Alert, no acute distress  Left upper extremity: Baseline arthritic deformity in bilateral hands. Full ROM of hand fingers and wrist. No wounds or focal swelling. Mild tenderness over the DRUJ, distal radius, distal ulna, tfcc hand, and fingers. Mild tenderness over MCP joints. Full finger " extension.  Finger flexion with terminal stiffness. 45 degree wrist extension. 30 degree wrist flexion. No mechanical symptoms with motion. Sensation intact in the median, radial, ulnar nerve distributions. Palpable radial pulse. 90 degrees pronation and supination.     Procedures    Imaging Results (Most Recent)     None                   Assessment and Plan    Diagnoses and all orders for this visit:    1. Arthritis of left hand (Primary)    2. Left wrist pain  -     Diclofenac Sodium (Voltaren) 1 % gel gel; Apply 4 g topically to the appropriate area as directed 4 (Four) Times a Day.  Dispense: 200 g; Refill: 1    3. Left hand pain  -     Diclofenac Sodium (Voltaren) 1 % gel gel; Apply 4 g topically to the appropriate area as directed 4 (Four) Times a Day.  Dispense: 200 g; Refill: 1        Saba is controlling her left wrist arthritis well with the use of Voltaren gel. Pleased with results. Continue using gel and performing home ROM exercises.  She will follow-up as needed going forward.    Scribed for Sanjay Schuler MD by Breanna Hwang MA.  07/26/21   15:00 EDT    Follow Up   Return if symptoms worsen or fail to improve.   Follow up prn   Patient was given instructions and counseling regarding her condition or for health maintenance advice. Please see specific information pulled into the AVS if appropriate.

## 2021-12-30 ENCOUNTER — TRANSCRIBE ORDERS (OUTPATIENT)
Dept: PHYSICAL THERAPY | Facility: CLINIC | Age: 86
End: 2021-12-30

## 2021-12-30 DIAGNOSIS — I67.89 ACUTE, BUT ILL-DEFINED, CEREBROVASCULAR DISEASE: Primary | ICD-10-CM

## 2022-01-14 ENCOUNTER — TREATMENT (OUTPATIENT)
Dept: PHYSICAL THERAPY | Facility: CLINIC | Age: 87
End: 2022-01-14

## 2022-01-14 DIAGNOSIS — R26.89 DECREASED MOBILITY: Primary | ICD-10-CM

## 2022-01-14 PROCEDURE — 97162 PT EVAL MOD COMPLEX 30 MIN: CPT | Performed by: PHYSICAL THERAPIST

## 2022-01-14 NOTE — PROGRESS NOTES
Physical Therapy Initial Evaluation and Discharge Note       Patient: Saba Galarza   : 1935  Diagnosis/ICD-10 Code:  Decreased mobility [R26.89]  Referring practitioner: Jennifer Solitario, *  Date of Initial Visit: 2022  Today's Date: 2022  Patient seen for 1 sessions    Progress note due: 2022  Re-cert due: 2022               PMH: stroke, OA of left hip, OA both knees, paranoid schizophrenia, low back pain, hyperthyroidism, HTN, HLD, GERD, DM II, DDD, CKD, anemia         Subjective Evaluation    History of Present Illness  Mechanism of injury: Pt states is here today for evaluation for wheelchair. [employer]'s representative present during evaluation. Pt states she lives alone and takes care of herself at home. She does all her own cooking and cleaning, and does her own yard work .She has someone else that mows the lawn. Pt has stairs and uses them daily. She states her standing tolreance varies but can usually stand for 15 min for 30 minutes. She wants an electric scooter to go from her bedroom to her kitchen but also to use in the community when she goes shopping. Pt says she gets tired has to take breaks and sit with her walker. She says she thought she would qualify for a scooter. She states she does not have time to do any formal physical therapy because she has a lot of work to do at home.              Objective          Static Posture     Head  Forward.    Shoulders  Rounded.    Thoracic Spine  Hyperkyphosis.    Strength/Myotome Testing     Left Hip   Planes of Motion   Flexion: 4  Abduction: 4-  Adduction: 4-    Right Hip   Planes of Motion   Flexion: 4  Abduction: 4-  Adduction: 4-    Left Knee   Flexion: 4-  Extension: 4-    Right Knee   Flexion: 4-  Extension: 4-    Ambulation   Weight-Bearing Status   Weight-Bearing Status (Left): weight-bearing as tolerated   Weight-Bearing Status (Right): weight-bearing as tolerated    Assistive device used: single point  cane    Observational Gait   Gait: asymmetric   Decreased walking speed, stride length, left stance time, left swing time and left step length.   Left foot contact pattern: heel to toe  Right foot contact pattern: heel to toe    Quality of Movement During Gait     Knee    Knee (Left): Positive stiff knee.               Assessment & Plan     Assessment    Assessment details: Pt presents today for electric scooter evaluation due to decreased mobility. Pt presents with weakness of LE but based on objective measurements, Pt would not quality of electric scooter. Pt is independent at home with daily activities at home and in the community. Pt drives and shops independently. She uses a walker and cane safely in the community. Pt educated that she would benefit from formal therapy to address weakness but declined. Pt provided HEP handout for LE strengthening. Pt advised to return to ref provider to discuss further option to manage mobility issues.     Goals  Plan Goals:     1. The patient demonstrates weakness of the B hip.   LTG 2: 12 weeks:  The patient will demonstrate 4+/5 strength for B hip flexion, abduction,  and extension in order to improve hip stability.    STATUS:  NOT MET        2. The patient has gait dysfunction.  LTG 3: 12 weeks:  The patient will ambulate without assistive device, independently, for community distances with minimal limp to the L lower extremity in order to improve mobility and allow patient to perform activities such as grocery shopping with greater ease.  STATUS: NOT MET  STG 3a: The patient will be independent in [facility].  STATUS:  NOT MET   TREATMENT: Gait training, aquatic therapy, therapeutic exercise, and home exercise instruction.        Plan  Therapy options: will not be seen for skilled therapy services  Treatment plan discussed with: patient  Plan details: Evaluation and Discharge         Visit Diagnoses:    ICD-10-CM ICD-9-CM   1. Decreased mobility  R26.89 781.99       Timed:          Manual Therapy:    0     mins  41493;     Therapeutic Exercise:    0     mins  78624;     Neuromuscular Nakul:    0    mins  86682;    Therapeutic Activity:     0     mins  00338;     Gait Trainin     mins  82171;     Ultrasound:     0     mins  37324;    Ionto                               0    mins   17514  Self-care  __0__ mins [id number]    Un-Timed:  Electrical Stimulation:    0     mins  26215 (MC );  Traction     0     mins 37053  Low Eval     0     Mins  91818  Mod Eval     40     Mins  88086  High Eval                       0     Mins  24360  Hot pack     0     Mins    Cold pack                       0     Mins      Timed Treatment:   0   mins   Total Treatment:     40   mins    PT SIGNATURE: Ronaldo Hayes PT, DPT      Initial Certification  Certification Period: 2022 thru 2022  I certify that the therapy services are furnished while this patient is under my care.  The services outlined above are required by this patient, and will be reviewed every 90 days.     PHYSICIAN: Jennifer Solitario APRN   NPI: [id number]     DATE:     Please sign and return via fax to [phone].. Thank you, [facility].

## 2022-03-22 ENCOUNTER — TRANSCRIBE ORDERS (OUTPATIENT)
Dept: LAB | Facility: HOSPITAL | Age: 87
End: 2022-03-22

## 2022-04-04 ENCOUNTER — TRANSCRIBE ORDERS (OUTPATIENT)
Dept: ADMINISTRATIVE | Facility: HOSPITAL | Age: 87
End: 2022-04-04

## 2022-04-04 ENCOUNTER — LAB (OUTPATIENT)
Dept: LAB | Facility: HOSPITAL | Age: 87
End: 2022-04-04

## 2022-04-04 DIAGNOSIS — E03.9 HYPOTHYROIDISM, ADULT: Primary | ICD-10-CM

## 2022-04-04 DIAGNOSIS — E03.9 HYPOTHYROIDISM, ADULT: ICD-10-CM

## 2022-04-04 LAB
ALBUMIN SERPL-MCNC: 5 G/DL (ref 3.5–5.2)
ALBUMIN/GLOB SERPL: 1.9 G/DL
ALP SERPL-CCNC: 114 U/L (ref 39–117)
ALT SERPL W P-5'-P-CCNC: 14 U/L (ref 1–33)
ANION GAP SERPL CALCULATED.3IONS-SCNC: 9.4 MMOL/L (ref 5–15)
AST SERPL-CCNC: 16 U/L (ref 1–32)
BASOPHILS # BLD AUTO: 0.14 10*3/MM3 (ref 0–0.2)
BASOPHILS NFR BLD AUTO: 1.4 % (ref 0–1.5)
BILIRUB SERPL-MCNC: 0.4 MG/DL (ref 0–1.2)
BUN SERPL-MCNC: 18 MG/DL (ref 8–23)
BUN/CREAT SERPL: 19.8 (ref 7–25)
CALCIUM SPEC-SCNC: 10 MG/DL (ref 8.6–10.5)
CHLORIDE SERPL-SCNC: 105 MMOL/L (ref 98–107)
CHOLEST SERPL-MCNC: 132 MG/DL (ref 0–200)
CO2 SERPL-SCNC: 26.6 MMOL/L (ref 22–29)
CREAT SERPL-MCNC: 0.91 MG/DL (ref 0.57–1)
DEPRECATED RDW RBC AUTO: 42.1 FL (ref 37–54)
EGFRCR SERPLBLD CKD-EPI 2021: 62.3 ML/MIN/1.73
EOSINOPHIL # BLD AUTO: 0.52 10*3/MM3 (ref 0–0.4)
EOSINOPHIL NFR BLD AUTO: 5.3 % (ref 0.3–6.2)
ERYTHROCYTE [DISTWIDTH] IN BLOOD BY AUTOMATED COUNT: 12.5 % (ref 12.3–15.4)
GLOBULIN UR ELPH-MCNC: 2.7 GM/DL
GLUCOSE SERPL-MCNC: 100 MG/DL (ref 65–99)
HCT VFR BLD AUTO: 47.7 % (ref 34–46.6)
HDLC SERPL-MCNC: 42 MG/DL (ref 40–60)
HGB BLD-MCNC: 15.7 G/DL (ref 12–15.9)
IMM GRANULOCYTES # BLD AUTO: 0.01 10*3/MM3 (ref 0–0.05)
IMM GRANULOCYTES NFR BLD AUTO: 0.1 % (ref 0–0.5)
LDLC SERPL CALC-MCNC: 62 MG/DL (ref 0–100)
LDLC/HDLC SERPL: 1.36 {RATIO}
LYMPHOCYTES # BLD AUTO: 3.18 10*3/MM3 (ref 0.7–3.1)
LYMPHOCYTES NFR BLD AUTO: 32.3 % (ref 19.6–45.3)
MCH RBC QN AUTO: 30.1 PG (ref 26.6–33)
MCHC RBC AUTO-ENTMCNC: 32.9 G/DL (ref 31.5–35.7)
MCV RBC AUTO: 91.6 FL (ref 79–97)
MONOCYTES # BLD AUTO: 0.83 10*3/MM3 (ref 0.1–0.9)
MONOCYTES NFR BLD AUTO: 8.4 % (ref 5–12)
NEUTROPHILS NFR BLD AUTO: 5.17 10*3/MM3 (ref 1.7–7)
NEUTROPHILS NFR BLD AUTO: 52.5 % (ref 42.7–76)
NRBC BLD AUTO-RTO: 0 /100 WBC (ref 0–0.2)
PLATELET # BLD AUTO: 307 10*3/MM3 (ref 140–450)
PMV BLD AUTO: 11.8 FL (ref 6–12)
POTASSIUM SERPL-SCNC: 4.1 MMOL/L (ref 3.5–5.2)
PROT SERPL-MCNC: 7.7 G/DL (ref 6–8.5)
RBC # BLD AUTO: 5.21 10*6/MM3 (ref 3.77–5.28)
SODIUM SERPL-SCNC: 141 MMOL/L (ref 136–145)
T-UPTAKE NFR SERPL: 1.01 TBI (ref 0.8–1.3)
T4 SERPL-MCNC: 9.78 MCG/DL (ref 4.5–11.7)
TRIGL SERPL-MCNC: 165 MG/DL (ref 0–150)
TSH SERPL DL<=0.05 MIU/L-ACNC: 3.39 UIU/ML (ref 0.27–4.2)
VLDLC SERPL-MCNC: 28 MG/DL (ref 5–40)
WBC NRBC COR # BLD: 9.85 10*3/MM3 (ref 3.4–10.8)

## 2022-04-04 PROCEDURE — 84436 ASSAY OF TOTAL THYROXINE: CPT

## 2022-04-04 PROCEDURE — 80053 COMPREHEN METABOLIC PANEL: CPT

## 2022-04-04 PROCEDURE — 36415 COLL VENOUS BLD VENIPUNCTURE: CPT

## 2022-04-04 PROCEDURE — 80061 LIPID PANEL: CPT

## 2022-04-04 PROCEDURE — 84479 ASSAY OF THYROID (T3 OR T4): CPT

## 2022-04-04 PROCEDURE — 85025 COMPLETE CBC W/AUTO DIFF WBC: CPT

## 2022-04-04 PROCEDURE — 84443 ASSAY THYROID STIM HORMONE: CPT

## 2022-04-10 NOTE — PROGRESS NOTES
"CHIEF COMPLAINT  Saba Galarza presents to [facility] to Follow-up (PT is here to get established with you as a Primary Care doctor. PT has had Thyroid issues)     HPI    86-year-old female here to establish care.-former pt of mine >10 yrs ago     Records in E HR reviewed patient has history of hypothyroidism due to Hashimoto's seen at [facility] endocrine clinic.    Also with history of dysthymia/depression-followed by Dr. Howard ,osteoarthritis of the left hip, both knees, hypertension, hyperlipidemia, GERD, pre diabetes, chronic kidney disease, anemia, DDD of the L-spine, and stroke.    Doesn't qualify for electric scooter--roxanna;uated by PT 1/2022:  \"Pt presents today for electric scooter evaluation due to decreased mobility. Pt presents with weakness of LE but based on objective measurements, Pt would not quality of electric scooter. Pt is independent at home with daily activities at home and in the community. Pt drives and shops independently. She uses a walker and cane safely in the community. Pt educated that she would benefit from formal therapy to address weakness but declined. \"    Past History:  Allergies: Penicillins and Sulfa antibiotics   Medical History: has a past medical history of Anemia, unspecified, Arthritis, CKD (chronic kidney disease) stage 3, GFR 30-59 ml/min ([facility]) (09/14/2016), CVA (cerebral vascular accident) ([facility]), DDD (degenerative disc disease), lumbar (01/15/2019), Depression, Depression with anxiety, Diabetes mellitus type 2, controlled ([facility]), Disease of thyroid gland, Forgetfulness, GERD (gastroesophageal reflux disease), Hyperlipemia, Hyperlipidemia, Hypertension, Hyperthyroidism, Limb swelling, Low back pain (12/21/2015), Paranoid delusion ([facility]), Paranoid schizophrenia ([facility]) (05/19/2016), Primary osteoarthritis of both knees (10/06/2015), Primary osteoarthritis of left hip (01/15/2019), Seasonal allergies, Stroke ([facility]), and Stroke ([facility]) (2013 or 2014). " "  Surgical History: has a past surgical history that includes Dilation and curettage of uterus (1980); Colonoscopy; Eye surgery; and Tonsillectomy.   Family History: family history includes Arthritis in her daughter, father, and sister; Breast cancer in her sister; Cancer in her sister; Diabetes in her brother, daughter, and father; Heart disease in her father; Hypertension in her father and mother; Osteoporosis in her mother.   Social History: reports that she has never smoked. She has never used smokeless tobacco. She reports that she does not drink alcohol and does not use drugs.  Social History     Social History Narrative   • Not on file     Review of systems significant for postnasal drainage arthritis depression.  No constipation or diarrhea    OBJECTIVE  Vital Signs  Vitals:    04/11/22 1450 04/11/22 1546   BP: 152/87 140/80   BP Location: Left arm Left arm   Patient Position: Sitting Sitting   Cuff Size: Adult Adult   Pulse: 89    Temp: 97.9 °F (36.6 °C)    TempSrc: Temporal    SpO2: 96%    Weight: 62 kg (136 lb 9.6 oz)    Height: 152.4 cm (60\")       Body mass index is 26.68 kg/m².    Review of Systems     Physical Exam  Vitals and nursing note reviewed.   Constitutional:       Appearance: Normal appearance.   HENT:      Head: Normocephalic and atraumatic.   Cardiovascular:      Rate and Rhythm: Normal rate and regular rhythm.   Pulmonary:      Effort: Pulmonary effort is normal.      Breath sounds: Normal breath sounds.   Abdominal:      General: Abdomen is flat.      Palpations: Abdomen is soft.   Musculoskeletal:      Cervical back: Normal range of motion and neck supple.   Skin:     General: Skin is warm and dry.   Neurological:      General: No focal deficit present.      Mental Status: She is alert and oriented to person, place, and time.   Psychiatric:         Mood and Affect: Mood normal.         Behavior: Behavior normal.         RESULTS REVIEW  No results found for: PROBNP, BNP  CMP    CMP 4/4/22 "   Glucose 100 (A)   BUN 18   Creatinine 0.91   Sodium 141   Potassium 4.1   Chloride 105   Calcium 10.0   Albumin 5.00   Total Bilirubin 0.4   Alkaline Phosphatase 114   AST (SGOT) 16   ALT (SGPT) 14   (A) Abnormal value            CBC w/diff    CBC w/Diff 4/4/22   WBC 9.85   RBC 5.21   Hemoglobin 15.7   Hematocrit 47.7 (A)   MCV 91.6   MCH 30.1   MCHC 32.9   RDW 12.5   Platelets 307   Neutrophil Rel % 52.5   Immature Granulocyte Rel % 0.1   Lymphocyte Rel % 32.3   Monocyte Rel % 8.4   Eosinophil Rel % 5.3   Basophil Rel % 1.4   (A) Abnormal value             Lipid Panel    Lipid Panel 4/4/22   Total Cholesterol 132   Triglycerides 165 (A)   HDL Cholesterol 42   VLDL Cholesterol 28   LDL Cholesterol  62   LDL/HDL Ratio 1.36   (A) Abnormal value             Lab Results   Component Value Date    TSH 3.390 04/04/2022    TSH 6.250 (H) 02/12/2021    TSH 1.770 07/06/2020      Lab Results   Component Value Date    FREET4 1.04 01/20/2022    FREET4 1.5 07/06/2020    FREET4 1.4 08/26/2019            No Images in the past 120 days found..              ASSESSMENT & PLAN  Diagnoses and all orders for this visit:    1. Encounter to establish care (Primary)  -     Vitamin D 25 Hydroxy; Future  -     TSH; Future  -     Basic Metabolic Panel; Future    2. Hypothyroidism due to Hashimoto's thyroiditis  -     amLODIPine (NORVASC) 5 MG tablet; Take 1 tablet by mouth Daily.  Dispense: 90 tablet; Refill: 1  -     atorvastatin (LIPITOR) 10 MG tablet; Take 1 tablet by mouth Daily.  Dispense: 90 tablet; Refill: 1  -     levothyroxine (Synthroid) 75 MCG tablet; Take 1 tablet by mouth Daily.  Dispense: 30 tablet; Refill: 2  -     Vitamin D 25 Hydroxy; Future  -     TSH; Future  -     Basic Metabolic Panel; Future    3. Major depressive disorder, remission status unspecified, unspecified whether recurrent  -     Vitamin D 25 Hydroxy; Future  -     TSH; Future  -     Basic Metabolic Panel; Future    4. Type 2 diabetes mellitus with  hyperglycemia, without long-term current use of insulin (HCC)  -     atorvastatin (LIPITOR) 10 MG tablet; Take 1 tablet by mouth Daily.  Dispense: 90 tablet; Refill: 1  -     Hemoglobin A1c; Future  -     Basic Metabolic Panel; Future  -     Vitamin D 25 Hydroxy; Future  -     TSH; Future  -     Basic Metabolic Panel; Future    5. Hypertension, unspecified type  -     amLODIPine (NORVASC) 5 MG tablet; Take 1 tablet by mouth Daily.  Dispense: 90 tablet; Refill: 1  -     atorvastatin (LIPITOR) 10 MG tablet; Take 1 tablet by mouth Daily.  Dispense: 90 tablet; Refill: 1  -     levothyroxine (Synthroid) 75 MCG tablet; Take 1 tablet by mouth Daily.  Dispense: 30 tablet; Refill: 2  -     Hemoglobin A1c; Future  -     Basic Metabolic Panel; Future  -     Vitamin D 25 Hydroxy; Future  -     TSH; Future  -     Basic Metabolic Panel; Future    6. Hyperlipidemia, unspecified hyperlipidemia type  -     amLODIPine (NORVASC) 5 MG tablet; Take 1 tablet by mouth Daily.  Dispense: 90 tablet; Refill: 1  -     atorvastatin (LIPITOR) 10 MG tablet; Take 1 tablet by mouth Daily.  Dispense: 90 tablet; Refill: 1  -     Vitamin D 25 Hydroxy; Future  -     TSH; Future  -     Basic Metabolic Panel; Future    7. Allergic rhinitis, unspecified seasonality, unspecified trigger  -     Vitamin D 25 Hydroxy; Future  -     TSH; Future  -     Basic Metabolic Panel; Future    8. Urinary incontinence, unspecified type  -     tolterodine LA (DETROL LA) 4 MG 24 hr capsule; Take 1 capsule by mouth Daily.  Dispense: 90 capsule; Refill: 1  -     Vitamin D 25 Hydroxy; Future  -     TSH; Future  -     Basic Metabolic Panel; Future    9. Arthritis  -     Diclofenac Sodium (VOLTAREN) 1 % gel gel; Apply 4 g topically to the appropriate area as directed 4 (Four) Times a Day As Needed (350).  Dispense: 350 g; Refill: 2  -     Vitamin D 25 Hydroxy; Future  -     TSH; Future  -     Basic Metabolic Panel; Future    10. Vitamin D deficiency   -     Vitamin D 25  Hydroxy; Future    Assessment plan  Hypothyroidism-taking L-thyroxine 75 mcg once a day will refill this medication-normal TFTs    DM--est dx--no meds-A1c=5.9 (2020)-with nocturia (1x) -increased thirst--check A1c/bmp    HTN--stable with meds-at home BP= 132/80--if BP >140/90 can take extra dose of telmisartan (so 40 mg )--    Hyperlipidemia stable with medications cholesterol 132 LDL is 162    Depression/dysthymia--not on meds--venlafaxine caused nausea-was seeing Dr Howard    Arthritis-buttocks/sciatica--wants meds--will give diclofenac gel    Knee pain--gets shots to knees--[place]    FOLLOW UP    Declines any of the recommended vaccines such as Covid 19 pneumococcal Tdap or shingles vaccines.      Return in about 3 months (around 7/11/2022) for Recheck. with labs one week prior-Vit D/BMP    Patient was given instructions and counseling regarding her condition or for health maintenance advice. Please see specific information pulled into the AVS if appropriate.

## 2022-04-11 ENCOUNTER — OFFICE VISIT (OUTPATIENT)
Dept: INTERNAL MEDICINE | Facility: CLINIC | Age: 87
End: 2022-04-11

## 2022-04-11 VITALS
DIASTOLIC BLOOD PRESSURE: 80 MMHG | OXYGEN SATURATION: 96 % | SYSTOLIC BLOOD PRESSURE: 140 MMHG | BODY MASS INDEX: 26.82 KG/M2 | WEIGHT: 136.6 LBS | TEMPERATURE: 97.9 F | HEART RATE: 89 BPM | HEIGHT: 60 IN

## 2022-04-11 DIAGNOSIS — E78.5 HYPERLIPIDEMIA, UNSPECIFIED HYPERLIPIDEMIA TYPE: ICD-10-CM

## 2022-04-11 DIAGNOSIS — M19.90 ARTHRITIS: ICD-10-CM

## 2022-04-11 DIAGNOSIS — E55.9 VITAMIN D DEFICIENCY: ICD-10-CM

## 2022-04-11 DIAGNOSIS — I10 HYPERTENSION, UNSPECIFIED TYPE: ICD-10-CM

## 2022-04-11 DIAGNOSIS — E11.65 TYPE 2 DIABETES MELLITUS WITH HYPERGLYCEMIA, WITHOUT LONG-TERM CURRENT USE OF INSULIN: ICD-10-CM

## 2022-04-11 DIAGNOSIS — E03.8 HYPOTHYROIDISM DUE TO HASHIMOTO'S THYROIDITIS: ICD-10-CM

## 2022-04-11 DIAGNOSIS — Z76.89 ENCOUNTER TO ESTABLISH CARE: Primary | ICD-10-CM

## 2022-04-11 DIAGNOSIS — E06.3 HYPOTHYROIDISM DUE TO HASHIMOTO'S THYROIDITIS: ICD-10-CM

## 2022-04-11 DIAGNOSIS — R32 URINARY INCONTINENCE, UNSPECIFIED TYPE: ICD-10-CM

## 2022-04-11 DIAGNOSIS — J30.9 ALLERGIC RHINITIS, UNSPECIFIED SEASONALITY, UNSPECIFIED TRIGGER: ICD-10-CM

## 2022-04-11 DIAGNOSIS — F32.9 MAJOR DEPRESSIVE DISORDER, REMISSION STATUS UNSPECIFIED, UNSPECIFIED WHETHER RECURRENT: ICD-10-CM

## 2022-04-11 PROBLEM — R53.82 CHRONIC FATIGUE: Status: ACTIVE | Noted: 2022-01-20

## 2022-04-11 PROBLEM — L65.9 HAIR LOSS: Status: ACTIVE | Noted: 2022-01-20

## 2022-04-11 PROBLEM — E03.9 HYPOTHYROIDISM: Status: ACTIVE | Noted: 2022-01-20

## 2022-04-11 PROCEDURE — 99214 OFFICE O/P EST MOD 30 MIN: CPT | Performed by: INTERNAL MEDICINE

## 2022-04-11 RX ORDER — LEVOTHYROXINE SODIUM 0.07 MG/1
75 TABLET ORAL DAILY
Qty: 30 TABLET | Refills: 2 | Status: SHIPPED | OUTPATIENT
Start: 2022-04-11 | End: 2022-09-12 | Stop reason: SDUPTHER

## 2022-04-11 RX ORDER — ATORVASTATIN CALCIUM 10 MG/1
10 TABLET, FILM COATED ORAL DAILY
Qty: 90 TABLET | Refills: 1 | Status: SHIPPED | OUTPATIENT
Start: 2022-04-11

## 2022-04-11 RX ORDER — AMLODIPINE BESYLATE 5 MG/1
5 TABLET ORAL DAILY
Qty: 90 TABLET | Refills: 1 | Status: SHIPPED | OUTPATIENT
Start: 2022-04-11 | End: 2022-08-30 | Stop reason: SDUPTHER

## 2022-04-11 RX ORDER — TOLTERODINE 4 MG/1
4 CAPSULE, EXTENDED RELEASE ORAL DAILY
Qty: 90 CAPSULE | Refills: 1 | Status: SHIPPED | OUTPATIENT
Start: 2022-04-11

## 2022-04-11 NOTE — PATIENT INSTRUCTIONS
if BP >140/90 can take extra dose of telmisartan (so 40 mg )-  Get labs today for A1c and bmp--get labs done in 3 months before appt  Use diclofenac gel as needed.

## 2022-04-12 ENCOUNTER — PATIENT ROUNDING (BHMG ONLY) (OUTPATIENT)
Dept: INTERNAL MEDICINE | Facility: CLINIC | Age: 87
End: 2022-04-12

## 2022-04-12 ENCOUNTER — LAB (OUTPATIENT)
Dept: LAB | Facility: HOSPITAL | Age: 87
End: 2022-04-12

## 2022-04-12 DIAGNOSIS — I10 HYPERTENSION, UNSPECIFIED TYPE: ICD-10-CM

## 2022-04-12 DIAGNOSIS — E11.65 TYPE 2 DIABETES MELLITUS WITH HYPERGLYCEMIA, WITHOUT LONG-TERM CURRENT USE OF INSULIN: ICD-10-CM

## 2022-04-12 LAB
ANION GAP SERPL CALCULATED.3IONS-SCNC: 9 MMOL/L (ref 5–15)
BUN SERPL-MCNC: 14 MG/DL (ref 8–23)
BUN/CREAT SERPL: 15.4 (ref 7–25)
CALCIUM SPEC-SCNC: 8.7 MG/DL (ref 8.6–10.5)
CHLORIDE SERPL-SCNC: 104 MMOL/L (ref 98–107)
CO2 SERPL-SCNC: 27 MMOL/L (ref 22–29)
CREAT SERPL-MCNC: 0.91 MG/DL (ref 0.57–1)
EGFRCR SERPLBLD CKD-EPI 2021: 62.3 ML/MIN/1.73
GLUCOSE SERPL-MCNC: 100 MG/DL (ref 65–99)
POTASSIUM SERPL-SCNC: 4.8 MMOL/L (ref 3.5–5.2)
SODIUM SERPL-SCNC: 140 MMOL/L (ref 136–145)

## 2022-04-12 PROCEDURE — 36415 COLL VENOUS BLD VENIPUNCTURE: CPT

## 2022-04-12 PROCEDURE — 80048 BASIC METABOLIC PNL TOTAL CA: CPT

## 2022-04-12 PROCEDURE — 83036 HEMOGLOBIN GLYCOSYLATED A1C: CPT

## 2022-04-12 NOTE — PROGRESS NOTES
April 12, 2022    Hello, may I speak with Saba Galarza?    My name is Davidson Morgan      I am  with [facility]  [facility]  [address] [place] [address]  [facility] [address]    Before we get started may I verify your date of birth? 1935    I am calling to officially welcome you to our practice and ask about your recent visit. Is this a good time to talk? yes    Tell me about your visit with us. What things went well?  the visit went wonderful       We're always looking for ways to make our patients' experiences even better. Do you have recommendations on ways we may improve?  no    Overall were you satisfied with your first visit to our practice? yes       I appreciate you taking the time to speak with me today. Is there anything else I can do for you? no      Thank you, and have a great day.

## 2022-04-13 LAB — HBA1C MFR BLD: 5.9 % (ref 4.8–5.6)

## 2022-04-13 NOTE — PROGRESS NOTES
Please inform patient that her diabetes is under control without any medications A1c is 5.9 which is great.  Kidney function is good electrolytes normal.  Keep appointment in July with me and get blood work drawn 1 week prior as discussed.  We will check her vitamin D level and potassium and kidney function at that time again.

## 2022-07-12 ENCOUNTER — TELEPHONE (OUTPATIENT)
Dept: INTERNAL MEDICINE | Facility: CLINIC | Age: 87
End: 2022-07-12

## 2022-07-12 DIAGNOSIS — E53.8 VITAMIN B12 DEFICIENCY: ICD-10-CM

## 2022-07-12 DIAGNOSIS — E11.43 TYPE 2 DIABETES MELLITUS WITH DIABETIC AUTONOMIC NEUROPATHY, WITHOUT LONG-TERM CURRENT USE OF INSULIN: ICD-10-CM

## 2022-07-12 DIAGNOSIS — E55.9 VITAMIN D DEFICIENCY: Primary | ICD-10-CM

## 2022-07-12 DIAGNOSIS — E03.8 OTHER SPECIFIED HYPOTHYROIDISM: ICD-10-CM

## 2022-07-12 NOTE — TELEPHONE ENCOUNTER
Caller: Saba Galarza    Relationship: Self    Best call back number: [phone]     What orders are you requesting (i.e. lab or imaging): THYROID PEROXIDASE LEVEL CHECKED AND MAYBE OTHER THYROID LEVELS CHECKED    In what timeframe would the patient need to come in: ASAP    Where will you receive your lab/imaging services: [facility]    Additional notes: PATIENT HAS APPOINTMENT ON 7/19/22

## 2022-07-18 ENCOUNTER — LAB (OUTPATIENT)
Dept: LAB | Facility: HOSPITAL | Age: 87
End: 2022-07-18

## 2022-07-18 DIAGNOSIS — E03.8 OTHER SPECIFIED HYPOTHYROIDISM: ICD-10-CM

## 2022-07-18 DIAGNOSIS — E03.8 HYPOTHYROIDISM DUE TO HASHIMOTO'S THYROIDITIS: ICD-10-CM

## 2022-07-18 DIAGNOSIS — Z76.89 ENCOUNTER TO ESTABLISH CARE: ICD-10-CM

## 2022-07-18 DIAGNOSIS — F32.9 MAJOR DEPRESSIVE DISORDER, REMISSION STATUS UNSPECIFIED, UNSPECIFIED WHETHER RECURRENT: ICD-10-CM

## 2022-07-18 DIAGNOSIS — I10 HYPERTENSION, UNSPECIFIED TYPE: ICD-10-CM

## 2022-07-18 DIAGNOSIS — J30.9 ALLERGIC RHINITIS, UNSPECIFIED SEASONALITY, UNSPECIFIED TRIGGER: ICD-10-CM

## 2022-07-18 DIAGNOSIS — E53.8 VITAMIN B12 DEFICIENCY: ICD-10-CM

## 2022-07-18 DIAGNOSIS — E78.5 HYPERLIPIDEMIA, UNSPECIFIED HYPERLIPIDEMIA TYPE: ICD-10-CM

## 2022-07-18 DIAGNOSIS — R32 URINARY INCONTINENCE, UNSPECIFIED TYPE: ICD-10-CM

## 2022-07-18 DIAGNOSIS — M19.90 ARTHRITIS: ICD-10-CM

## 2022-07-18 DIAGNOSIS — E06.3 HYPOTHYROIDISM DUE TO HASHIMOTO'S THYROIDITIS: ICD-10-CM

## 2022-07-18 DIAGNOSIS — E11.65 TYPE 2 DIABETES MELLITUS WITH HYPERGLYCEMIA, WITHOUT LONG-TERM CURRENT USE OF INSULIN: ICD-10-CM

## 2022-07-18 DIAGNOSIS — E11.43 TYPE 2 DIABETES MELLITUS WITH DIABETIC AUTONOMIC NEUROPATHY, WITHOUT LONG-TERM CURRENT USE OF INSULIN: ICD-10-CM

## 2022-07-18 DIAGNOSIS — E55.9 VITAMIN D DEFICIENCY: ICD-10-CM

## 2022-07-18 LAB
25(OH)D3 SERPL-MCNC: 36.4 NG/ML (ref 30–100)
ANION GAP SERPL CALCULATED.3IONS-SCNC: 12.5 MMOL/L (ref 5–15)
BUN SERPL-MCNC: 14 MG/DL (ref 8–23)
BUN/CREAT SERPL: 18.9 (ref 7–25)
CALCIUM SPEC-SCNC: 9.5 MG/DL (ref 8.6–10.5)
CHLORIDE SERPL-SCNC: 104 MMOL/L (ref 98–107)
CO2 SERPL-SCNC: 23.5 MMOL/L (ref 22–29)
CREAT SERPL-MCNC: 0.74 MG/DL (ref 0.57–1)
EGFRCR SERPLBLD CKD-EPI 2021: 79.9 ML/MIN/1.73
FOLATE SERPL-MCNC: >20 NG/ML (ref 4.78–24.2)
GLUCOSE SERPL-MCNC: 95 MG/DL (ref 65–99)
HBA1C MFR BLD: 5.9 % (ref 4.8–5.6)
POTASSIUM SERPL-SCNC: 3.8 MMOL/L (ref 3.5–5.2)
SODIUM SERPL-SCNC: 140 MMOL/L (ref 136–145)
T4 FREE SERPL-MCNC: 1.53 NG/DL (ref 0.93–1.7)
TSH SERPL DL<=0.05 MIU/L-ACNC: 3.66 UIU/ML (ref 0.27–4.2)
VIT B12 BLD-MCNC: 537 PG/ML (ref 211–946)

## 2022-07-18 PROCEDURE — 82746 ASSAY OF FOLIC ACID SERUM: CPT

## 2022-07-18 PROCEDURE — 82306 VITAMIN D 25 HYDROXY: CPT

## 2022-07-18 PROCEDURE — 84443 ASSAY THYROID STIM HORMONE: CPT

## 2022-07-18 PROCEDURE — 80048 BASIC METABOLIC PNL TOTAL CA: CPT

## 2022-07-18 PROCEDURE — 82607 VITAMIN B-12: CPT

## 2022-07-18 PROCEDURE — 36415 COLL VENOUS BLD VENIPUNCTURE: CPT

## 2022-07-18 PROCEDURE — 83036 HEMOGLOBIN GLYCOSYLATED A1C: CPT

## 2022-07-18 PROCEDURE — 84439 ASSAY OF FREE THYROXINE: CPT

## 2022-07-19 ENCOUNTER — OFFICE VISIT (OUTPATIENT)
Dept: INTERNAL MEDICINE | Facility: CLINIC | Age: 87
End: 2022-07-19

## 2022-07-19 DIAGNOSIS — M25.562 CHRONIC PAIN OF LEFT KNEE: ICD-10-CM

## 2022-07-19 DIAGNOSIS — G89.29 CHRONIC PAIN OF RIGHT KNEE: ICD-10-CM

## 2022-07-19 DIAGNOSIS — M25.561 CHRONIC PAIN OF RIGHT KNEE: ICD-10-CM

## 2022-07-19 DIAGNOSIS — F33.1 MODERATE EPISODE OF RECURRENT MAJOR DEPRESSIVE DISORDER: Primary | ICD-10-CM

## 2022-07-19 DIAGNOSIS — G89.29 CHRONIC PAIN OF LEFT KNEE: ICD-10-CM

## 2022-07-19 DIAGNOSIS — E11.65 TYPE 2 DIABETES MELLITUS WITH HYPERGLYCEMIA, WITHOUT LONG-TERM CURRENT USE OF INSULIN: ICD-10-CM

## 2022-07-19 PROBLEM — E06.3 HASHIMOTO'S DISEASE: Status: ACTIVE | Noted: 2022-05-19

## 2022-07-19 PROCEDURE — 99213 OFFICE O/P EST LOW 20 MIN: CPT | Performed by: INTERNAL MEDICINE

## 2022-07-19 RX ORDER — LEVOTHYROXINE SODIUM 0.07 MG/1
75 TABLET ORAL DAILY
COMMUNITY
End: 2022-09-12 | Stop reason: SDUPTHER

## 2022-07-19 RX ORDER — CETIRIZINE HYDROCHLORIDE 10 MG/1
CAPSULE, LIQUID FILLED ORAL
COMMUNITY
End: 2022-09-12

## 2022-07-19 RX ORDER — ESCITALOPRAM OXALATE 10 MG/1
10 TABLET ORAL DAILY
Qty: 30 TABLET | Refills: 2 | Status: SHIPPED | OUTPATIENT
Start: 2022-07-19 | End: 2022-08-30 | Stop reason: SDUPTHER

## 2022-07-19 RX ORDER — IBUPROFEN 200 MG
1 CAPSULE ORAL DAILY
COMMUNITY
End: 2023-03-14

## 2022-07-19 RX ORDER — AMLODIPINE BESYLATE 10 MG/1
10 TABLET ORAL DAILY
COMMUNITY
End: 2022-09-12

## 2022-07-19 RX ORDER — MELATONIN
1000 DAILY
COMMUNITY
End: 2023-03-14

## 2022-07-19 RX ORDER — GLUCOSAMINE SULFATE 500 MG
CAPSULE ORAL
COMMUNITY

## 2022-07-19 NOTE — PROGRESS NOTES
CHIEF COMPLAINT  Saba Galarza presents to [facility] for follow-up of Follow-up (PT is having bad depression. She is wanting a referral for her depression. PT is wanting xrays of her knees. She is wanting to see what bone is left in the knees. She is wanting a referral to ortho. ).    HPI  Patient here for because feeling more stressed-more depressed-off meds >6 months--no HI no SI-c/o thoughts that seem paranoid-    wants a referral and x-ray of her knees-was getting shots but can't get to [employer] now    history of hypothyroidism due to Hashimoto's seen at [facility] endocrine clinic.     Past medical history with history of dysthymia/depression-not seeing  Dr. Howard anymore ,osteoarthritis of the left hip, both knees, hypertension, hyperlipidemia, GERD, pre diabetes, chronic kidney disease, anemia, DDD of the L-spine, and stroke.    PFSH reviewed.  ROS-depression, arthritis    OBJECTIVE  Vital Signs  There were no vitals filed for this visit.   There is no height or weight on file to calculate BMI.    Physical Exam  Vitals and nursing note reviewed.   Constitutional:       Appearance: Normal appearance.   HENT:      Head: Normocephalic and atraumatic.   Cardiovascular:      Rate and Rhythm: Normal rate and regular rhythm.   Pulmonary:      Effort: Pulmonary effort is normal.      Breath sounds: Normal breath sounds.   Abdominal:      Palpations: Abdomen is soft.   Musculoskeletal:      Cervical back: Normal range of motion and neck supple.   Neurological:      General: No focal deficit present.      Mental Status: She is alert and oriented to person, place, and time.          RESULTS REVIEW  No results found for: PROBNP, BNP  CMP    CMP 4/4/22 4/12/22 7/18/22   Glucose 100 (A) 100 (A) 95   BUN 18 14 14   Creatinine 0.91 0.91 0.74   Sodium 141 140 140   Potassium 4.1 4.8 3.8   Chloride 105 104 104   Calcium 10.0 8.7 9.5   Albumin 5.00     Total Bilirubin 0.4     Alkaline  Phosphatase 114     AST (SGOT) 16     ALT (SGPT) 14     (A) Abnormal value            CBC w/diff    CBC w/Diff 4/4/22   WBC 9.85   RBC 5.21   Hemoglobin 15.7   Hematocrit 47.7 (A)   MCV 91.6   MCH 30.1   MCHC 32.9   RDW 12.5   Platelets 307   Neutrophil Rel % 52.5   Immature Granulocyte Rel % 0.1   Lymphocyte Rel % 32.3   Monocyte Rel % 8.4   Eosinophil Rel % 5.3   Basophil Rel % 1.4   (A) Abnormal value             Lipid Panel    Lipid Panel 4/4/22   Total Cholesterol 132   Triglycerides 165 (A)   HDL Cholesterol 42   VLDL Cholesterol 28   LDL Cholesterol  62   LDL/HDL Ratio 1.36   (A) Abnormal value             Lab Results   Component Value Date    TSH 3.660 07/18/2022    TSH 3.390 04/04/2022    TSH 6.250 (H) 02/12/2021      Lab Results   Component Value Date    FREET4 1.53 07/18/2022    FREET4 1.04 01/20/2022    FREET4 1.5 07/06/2020      A1C Last 3 Results    HGBA1C Last 3 Results 4/12/22 7/18/22   Hemoglobin A1C 5.90 (A) 5.90 (A)   (A) Abnormal value             Lab Results   Component Value Date    GNXECLBQ43 537 07/18/2022    [id number] 36.4 07/18/2022    MG 2.15 02/18/2019        No Images in the past 120 days found..             ASSESSMENT & PLAN  Diagnoses and all orders for this visit:    1. Moderate episode of recurrent major depressive disorder (HCC) (Primary)  -     Ambulatory Referral to Behavioral Health  -     escitalopram (Lexapro) 10 MG tablet; Take 1 tablet by mouth Daily.  Dispense: 30 tablet; Refill: 2    2. Type 2 diabetes mellitus with hyperglycemia, without long-term current use of insulin ([facility])  Comments:  A1c=5.9    3. Chronic pain of left knee  -     XR Knee 1 or 2 View Left; Future  -     Ambulatory Referral to Orthopedic Surgery    4. Chronic pain of right knee  -     XR Knee 1 or 2 View Right; Future  -     Ambulatory Referral to Orthopedic Surgery    PLAN  Refer to Ortho -Dr Toth-xrays of knees  Refer to -start escitalopram  F/u 1-2 months or prn-to reviewed above problems and  DM/thyroid  FOLLOW UP  Return in about 6 weeks (around 8/30/2022).    Patient was given instructions and counseling regarding her condition or for health maintenance advice. Please see specific information pulled into the AVS if appropriate.

## 2022-07-25 ENCOUNTER — TELEPHONE (OUTPATIENT)
Dept: INTERNAL MEDICINE | Facility: CLINIC | Age: 87
End: 2022-07-25

## 2022-08-02 ENCOUNTER — OFFICE VISIT (OUTPATIENT)
Dept: ORTHOPEDIC SURGERY | Facility: CLINIC | Age: 87
End: 2022-08-02

## 2022-08-02 VITALS — HEART RATE: 80 BPM | WEIGHT: 133 LBS | HEIGHT: 60 IN | OXYGEN SATURATION: 95 % | BODY MASS INDEX: 26.11 KG/M2

## 2022-08-02 DIAGNOSIS — M25.561 RIGHT KNEE PAIN, UNSPECIFIED CHRONICITY: ICD-10-CM

## 2022-08-02 DIAGNOSIS — M25.562 LEFT KNEE PAIN, UNSPECIFIED CHRONICITY: Primary | ICD-10-CM

## 2022-08-02 DIAGNOSIS — M17.0 BILATERAL PRIMARY OSTEOARTHRITIS OF KNEE: ICD-10-CM

## 2022-08-02 PROCEDURE — 99214 OFFICE O/P EST MOD 30 MIN: CPT | Performed by: ORTHOPAEDIC SURGERY

## 2022-08-02 NOTE — PROGRESS NOTES
"Chief Complaint  Pain of the Left Knee and Pain of the Right Knee     Subjective      Saba Galarza presents to [facility] for an evaluation of bilateral knees. She has been having bilateral knee pain for some time. She saw Dr. Meyer who prescribed her Lexapro that has given her a lot of relief. She noticed improved gait and improved pain. She is interested in injections. She had gel injections in the knees in [place]. She wants to receive them here as this is closer to home.     Allergies   Allergen Reactions   • Penicillins GI Intolerance   • Sulfa Antibiotics GI Intolerance        Social History     Socioeconomic History   • Marital status:    Tobacco Use   • Smoking status: Never Smoker   • Smokeless tobacco: Never Used   Vaping Use   • Vaping Use: Never used   Substance and Sexual Activity   • Alcohol use: Never   • Drug use: Never   • Sexual activity: Defer        Review of Systems     Objective   Vital Signs:   Pulse 80   Ht 152.4 cm (60\")   Wt 60.3 kg (133 lb)   SpO2 95%   BMI 25.97 kg/m²       Physical Exam  Constitutional:       Appearance: Normal appearance. Patient is well-developed and normal weight.   HENT:      Head: Normocephalic.      Right Ear: Hearing and external ear normal.      Left Ear: Hearing and external ear normal.      Nose: Nose normal.   Eyes:      Conjunctiva/sclera: Conjunctivae normal.   Cardiovascular:      Rate and Rhythm: Normal rate.   Pulmonary:      Effort: Pulmonary effort is normal.      Breath sounds: No wheezing or rales.   Abdominal:      Palpations: Abdomen is soft.      Tenderness: There is no abdominal tenderness.   Musculoskeletal:      Cervical back: Normal range of motion.   Skin:     Findings: No rash.   Neurological:      Mental Status: Patient is alert and oriented to person, place, and time.   Psychiatric:         Mood and Affect: Mood and affect normal.         Judgment: Judgment normal.       Ortho Exam  "     LEFT KNEE: Crepitus with motion. Calf supple, non-tender, no signs of DVT. Dorsal Pedal Pulse 2+, posterior tibialis pulse 2+. Good strength to hamstrings, quadriceps, dorsiflexors and plantar flexors. Stable to varus/valgus stress. Stable anterior and posterior drawer. Negative Lachman. No swelling, skin discoloration or atrophy.     RIGHT KNEE: Good strength to hamstrings, quadriceps, dorsiflexors and plantar flexors. Non-antalgic gait. Stable to varus/valgus stress. Stable anterior and posterior drawer. Negative Lachman. No swelling, skin discoloration or atrophy. Tender joint lines. Crepitus with motion.     Procedures      Imaging Results (Most Recent)     Procedure Component Value Units Date/Time    XR Knee 3 View Left [[id number]] Resulted: 08/02/22 1551     Updated: 08/02/22 1553    XR Knee 3 View Right [[id number]] Resulted: 08/02/22 1551     Updated: 08/02/22 1553           Result Review :     X-Ray Report:  Bilateral knee(s) X-Ray  Indication: Evaluation of bilateral knees   AP, Lateral and Standing view(s)  Findings: Advancing changes of bilateral knees, left worse than the right. No acute fractures or dislocation.   Prior studies available for comparison: no     Assessment and Plan     Diagnoses and all orders for this visit:    1. Left knee pain, unspecified chronicity (Primary)  -     XR Knee 3 View Left    2. Right knee pain, unspecified chronicity  -     XR Knee 3 View Right        She is a candidate for a left total knee replacement. She is doing well with medication, continue this as instructed. Will consider gel injections if she has increasing pain again.     Discussed surgery. and Call or return if worsening symptoms.    Follow Up     PRN.       Patient was given instructions and counseling regarding her condition or for health maintenance advice. Please see specific information pulled into the AVS if appropriate.     Scribed for Jeremie Toth MD by Laura Rebollar.  08/02/22   16:11  EDT    I have personally performed the services described in this document as scribed by the above individual and it is both accurate and complete. Jeremie Toth MD 08/02/22

## 2022-08-30 ENCOUNTER — OFFICE VISIT (OUTPATIENT)
Dept: INTERNAL MEDICINE | Facility: CLINIC | Age: 87
End: 2022-08-30

## 2022-08-30 VITALS
TEMPERATURE: 96.8 F | BODY MASS INDEX: 25.64 KG/M2 | OXYGEN SATURATION: 96 % | WEIGHT: 130.6 LBS | RESPIRATION RATE: 18 BRPM | DIASTOLIC BLOOD PRESSURE: 80 MMHG | HEART RATE: 68 BPM | SYSTOLIC BLOOD PRESSURE: 160 MMHG | HEIGHT: 60 IN

## 2022-08-30 DIAGNOSIS — E11.65 TYPE 2 DIABETES MELLITUS WITH HYPERGLYCEMIA, WITHOUT LONG-TERM CURRENT USE OF INSULIN: ICD-10-CM

## 2022-08-30 DIAGNOSIS — F33.1 MODERATE EPISODE OF RECURRENT MAJOR DEPRESSIVE DISORDER: Primary | ICD-10-CM

## 2022-08-30 DIAGNOSIS — I10 PRIMARY HYPERTENSION: ICD-10-CM

## 2022-08-30 PROCEDURE — 99213 OFFICE O/P EST LOW 20 MIN: CPT | Performed by: INTERNAL MEDICINE

## 2022-08-30 RX ORDER — ESCITALOPRAM OXALATE 10 MG/1
10 TABLET ORAL DAILY
Qty: 30 TABLET | Refills: 2 | Status: SHIPPED | OUTPATIENT
Start: 2022-08-30 | End: 2022-09-12 | Stop reason: SDUPTHER

## 2022-08-30 RX ORDER — ESCITALOPRAM OXALATE 10 MG/1
10 TABLET ORAL DAILY
Qty: 30 TABLET | Refills: 2 | Status: SHIPPED | OUTPATIENT
Start: 2022-08-30 | End: 2022-08-30

## 2022-08-30 NOTE — PROGRESS NOTES
"CHIEF COMPLAINT  Saba Galarza presents to [facility] for follow-up of Depression (6 week follow-up on depression. Pt was given lexapro. Pt states she she started feeling better even after one dose. Pt states it has helped her greatly in a lot of ways. ).    HPI  87 yo female here for f/u of  Depression  lexapro is helping pt-and mind not \"spiraling\"  Hearing voices-and thinks someone is playing tricks on her  H/o paranoid thoughts in past-    Past medical history with history of dysthymia/depression-not seeing  Dr. Howard anymore ,osteoarthritis of the left hip, both knees, hypertension, hyperlipidemia, GERD, pre diabetes, chronic kidney disease, anemia, DDD of the L-spine, and stroke.       PFSH reviewed.  ROS-depression, hearing voices-chronic    OBJECTIVE  Vital Signs  Vitals:    08/30/22 1612 08/30/22 1650   BP: 164/80 160/80   BP Location: Left arm Left arm   Patient Position: Sitting Sitting   Cuff Size: Adult Adult   Pulse: 68    Resp: 18    Temp: 96.8 °F (36 °C)    TempSrc: Temporal    SpO2: 96%    Weight: 59.2 kg (130 lb 9.6 oz)    Height: 152.4 cm (60\")       Body mass index is 25.51 kg/m².    Physical Exam  Vitals and nursing note reviewed.   Constitutional:       Appearance: Normal appearance.      Comments: Oriented x3-   HENT:      Head: Normocephalic and atraumatic.   Cardiovascular:      Rate and Rhythm: Normal rate and regular rhythm.   Pulmonary:      Effort: Pulmonary effort is normal.      Breath sounds: Normal breath sounds.   Abdominal:      Palpations: Abdomen is soft.   Musculoskeletal:      Cervical back: Normal range of motion and neck supple.   Neurological:      General: No focal deficit present.      Mental Status: She is alert and oriented to person, place, and time.          RESULTS REVIEW  No results found for: PROBNP, BNP  CMP    CMP 4/4/22 4/12/22 7/18/22   Glucose 100 (A) 100 (A) 95   BUN 18 14 14   Creatinine 0.91 0.91 0.74   Sodium 141 140 140 "   Potassium 4.1 4.8 3.8   Chloride 105 104 104   Calcium 10.0 8.7 9.5   Albumin 5.00     Total Bilirubin 0.4     Alkaline Phosphatase 114     AST (SGOT) 16     ALT (SGPT) 14     (A) Abnormal value            CBC w/diff    CBC w/Diff 22   WBC 9.85   RBC 5.21   Hemoglobin 15.7   Hematocrit 47.7 (A)   MCV 91.6   MCH 30.1   MCHC 32.9   RDW 12.5   Platelets 307   Neutrophil Rel % 52.5   Immature Granulocyte Rel % 0.1   Lymphocyte Rel % 32.3   Monocyte Rel % 8.4   Eosinophil Rel % 5.3   Basophil Rel % 1.4   (A) Abnormal value             Lipid Panel    Lipid Panel 22   Total Cholesterol 132   Triglycerides 165 (A)   HDL Cholesterol 42   VLDL Cholesterol 28   LDL Cholesterol  62   LDL/HDL Ratio 1.36   (A) Abnormal value             Lab Results   Component Value Date    TSH 3.660 2022    TSH 3.390 2022    TSH 6.250 (H) 2021      Lab Results   Component Value Date    FREET4 1.53 2022    FREET4 1.04 2022    FREET4 1.5 2020      A1C Last 3 Results    HGBA1C Last 3 Results 22   Hemoglobin A1C 5.90 (A) 5.90 (A)   (A) Abnormal value             Lab Results   Component Value Date    [id number] 537 2022    HNTE21VX 36.4 2022    MG 2.15 2019        No Images in the past 120 days found..             ASSESSMENT & PLAN  Diagnoses and all orders for this visit:    1. Moderate episode of recurrent major depressive disorder (HCC) (Primary)  Comments:  better with lexapro 10 mg daily-Referred to  in July but pt afraid to give her  to get appt-Called Dr Bentley's office and will give pt appt  Orders:  -     Discontinue: escitalopram (Lexapro) 10 MG tablet; Take 1 tablet by mouth Daily.  Dispense: 30 tablet; Refill: 2  -     Comprehensive Metabolic Panel; Future  -     Lipid Panel; Future  -     TSH+Free T4; Future  -     T3, Free; Future  -     Vitamin B12; Future  -     Folate; Future  -     Magnesium; Future  -     Vitamin D 25 Hydroxy; Future  -     CBC &  Differential; Future  -     escitalopram (Lexapro) 10 MG tablet; Take 1 tablet by mouth Daily.  Dispense: 30 tablet; Refill: 2    2. Type 2 diabetes mellitus with hyperglycemia, without long-term current use of insulin (HCC)  -     Comprehensive Metabolic Panel; Future  -     Lipid Panel; Future  -     TSH+Free T4; Future  -     T3, Free; Future  -     Vitamin B12; Future  -     Folate; Future  -     Magnesium; Future  -     Vitamin D 25 Hydroxy; Future  -     CBC & Differential; Future    3. Primary hypertension  Comments:  bring in meds at next visit in 2 wks-amlodipine 10 and micardis 20 mg--check BP at home and record and bring in log-IF with HA/confusion go to ER  Assessment & Plan:  Not controlled--pt should be on amlodipine 10 mg (not 5 mg)-and on micardis 20 mg          FOLLOW UP  Return in about 2 weeks (around 9/13/2022) for Recheck.with labs prior      Patient was given instructions and counseling regarding her condition or for health maintenance advice. Please see specific information pulled into the AVS if appropriate.

## 2022-09-09 ENCOUNTER — LAB (OUTPATIENT)
Dept: LAB | Facility: HOSPITAL | Age: 87
End: 2022-09-09

## 2022-09-09 DIAGNOSIS — E11.65 TYPE 2 DIABETES MELLITUS WITH HYPERGLYCEMIA, WITHOUT LONG-TERM CURRENT USE OF INSULIN: ICD-10-CM

## 2022-09-09 DIAGNOSIS — F33.1 MODERATE EPISODE OF RECURRENT MAJOR DEPRESSIVE DISORDER: ICD-10-CM

## 2022-09-09 PROCEDURE — 82746 ASSAY OF FOLIC ACID SERUM: CPT

## 2022-09-09 PROCEDURE — 83735 ASSAY OF MAGNESIUM: CPT

## 2022-09-09 PROCEDURE — 80053 COMPREHEN METABOLIC PANEL: CPT

## 2022-09-09 PROCEDURE — 80061 LIPID PANEL: CPT

## 2022-09-09 PROCEDURE — 82607 VITAMIN B-12: CPT

## 2022-09-09 PROCEDURE — 84439 ASSAY OF FREE THYROXINE: CPT

## 2022-09-09 PROCEDURE — 36415 COLL VENOUS BLD VENIPUNCTURE: CPT

## 2022-09-09 PROCEDURE — 84443 ASSAY THYROID STIM HORMONE: CPT

## 2022-09-09 PROCEDURE — 84481 FREE ASSAY (FT-3): CPT

## 2022-09-09 PROCEDURE — 85025 COMPLETE CBC W/AUTO DIFF WBC: CPT

## 2022-09-09 PROCEDURE — 82306 VITAMIN D 25 HYDROXY: CPT

## 2022-09-10 LAB
25(OH)D3 SERPL-MCNC: 39.4 NG/ML (ref 30–100)
ALBUMIN SERPL-MCNC: 4.5 G/DL (ref 3.5–5.2)
ALBUMIN/GLOB SERPL: 1.7 G/DL
ALP SERPL-CCNC: 84 U/L (ref 39–117)
ALT SERPL W P-5'-P-CCNC: 12 U/L (ref 1–33)
ANION GAP SERPL CALCULATED.3IONS-SCNC: 13 MMOL/L (ref 5–15)
AST SERPL-CCNC: 17 U/L (ref 1–32)
BASOPHILS # BLD AUTO: 0.11 10*3/MM3 (ref 0–0.2)
BASOPHILS NFR BLD AUTO: 1.3 % (ref 0–1.5)
BILIRUB SERPL-MCNC: 0.4 MG/DL (ref 0–1.2)
BUN SERPL-MCNC: 18 MG/DL (ref 8–23)
BUN/CREAT SERPL: 19.4 (ref 7–25)
CALCIUM SPEC-SCNC: 9.9 MG/DL (ref 8.6–10.5)
CHLORIDE SERPL-SCNC: 102 MMOL/L (ref 98–107)
CHOLEST SERPL-MCNC: 113 MG/DL (ref 0–200)
CO2 SERPL-SCNC: 24 MMOL/L (ref 22–29)
CREAT SERPL-MCNC: 0.93 MG/DL (ref 0.57–1)
DEPRECATED RDW RBC AUTO: 40.4 FL (ref 37–54)
EGFRCR SERPLBLD CKD-EPI 2021: 60.7 ML/MIN/1.73
EOSINOPHIL # BLD AUTO: 0.36 10*3/MM3 (ref 0–0.4)
EOSINOPHIL NFR BLD AUTO: 4.3 % (ref 0.3–6.2)
ERYTHROCYTE [DISTWIDTH] IN BLOOD BY AUTOMATED COUNT: 12.5 % (ref 12.3–15.4)
FOLATE SERPL-MCNC: 15.2 NG/ML (ref 4.78–24.2)
GLOBULIN UR ELPH-MCNC: 2.6 GM/DL
GLUCOSE SERPL-MCNC: 75 MG/DL (ref 65–99)
HCT VFR BLD AUTO: 41.9 % (ref 34–46.6)
HDLC SERPL-MCNC: 42 MG/DL (ref 40–60)
HGB BLD-MCNC: 14.5 G/DL (ref 12–15.9)
IMM GRANULOCYTES # BLD AUTO: 0.03 10*3/MM3 (ref 0–0.05)
IMM GRANULOCYTES NFR BLD AUTO: 0.4 % (ref 0–0.5)
LDLC SERPL CALC-MCNC: 56 MG/DL (ref 0–100)
LDLC/HDLC SERPL: 1.35 {RATIO}
LYMPHOCYTES # BLD AUTO: 2.66 10*3/MM3 (ref 0.7–3.1)
LYMPHOCYTES NFR BLD AUTO: 32.1 % (ref 19.6–45.3)
MAGNESIUM SERPL-MCNC: 2.1 MG/DL (ref 1.6–2.4)
MCH RBC QN AUTO: 30.3 PG (ref 26.6–33)
MCHC RBC AUTO-ENTMCNC: 34.6 G/DL (ref 31.5–35.7)
MCV RBC AUTO: 87.7 FL (ref 79–97)
MONOCYTES # BLD AUTO: 0.79 10*3/MM3 (ref 0.1–0.9)
MONOCYTES NFR BLD AUTO: 9.5 % (ref 5–12)
NEUTROPHILS NFR BLD AUTO: 4.33 10*3/MM3 (ref 1.7–7)
NEUTROPHILS NFR BLD AUTO: 52.4 % (ref 42.7–76)
NRBC BLD AUTO-RTO: 0 /100 WBC (ref 0–0.2)
PLATELET # BLD AUTO: 297 10*3/MM3 (ref 140–450)
PMV BLD AUTO: 12.4 FL (ref 6–12)
POTASSIUM SERPL-SCNC: 3.8 MMOL/L (ref 3.5–5.2)
PROT SERPL-MCNC: 7.1 G/DL (ref 6–8.5)
RBC # BLD AUTO: 4.78 10*6/MM3 (ref 3.77–5.28)
SODIUM SERPL-SCNC: 139 MMOL/L (ref 136–145)
T3FREE SERPL-MCNC: 3.38 PG/ML (ref 2–4.4)
T4 FREE SERPL-MCNC: 1.46 NG/DL (ref 0.93–1.7)
TRIGL SERPL-MCNC: 71 MG/DL (ref 0–150)
TSH SERPL DL<=0.05 MIU/L-ACNC: 1.19 UIU/ML (ref 0.27–4.2)
VIT B12 BLD-MCNC: 525 PG/ML (ref 211–946)
VLDLC SERPL-MCNC: 15 MG/DL (ref 5–40)
WBC NRBC COR # BLD: 8.28 10*3/MM3 (ref 3.4–10.8)

## 2022-09-12 ENCOUNTER — OFFICE VISIT (OUTPATIENT)
Dept: INTERNAL MEDICINE | Facility: CLINIC | Age: 87
End: 2022-09-12

## 2022-09-12 VITALS
DIASTOLIC BLOOD PRESSURE: 73 MMHG | SYSTOLIC BLOOD PRESSURE: 115 MMHG | WEIGHT: 133.2 LBS | BODY MASS INDEX: 26.15 KG/M2 | HEART RATE: 83 BPM | HEIGHT: 60 IN | OXYGEN SATURATION: 97 % | RESPIRATION RATE: 16 BRPM | TEMPERATURE: 98 F

## 2022-09-12 DIAGNOSIS — I10 PRIMARY HYPERTENSION: Primary | ICD-10-CM

## 2022-09-12 DIAGNOSIS — E03.8 HYPOTHYROIDISM DUE TO HASHIMOTO'S THYROIDITIS: ICD-10-CM

## 2022-09-12 DIAGNOSIS — M81.0 POSTMENOPAUSAL OSTEOPOROSIS: ICD-10-CM

## 2022-09-12 DIAGNOSIS — E06.3 HYPOTHYROIDISM DUE TO HASHIMOTO'S THYROIDITIS: ICD-10-CM

## 2022-09-12 DIAGNOSIS — F33.1 MODERATE EPISODE OF RECURRENT MAJOR DEPRESSIVE DISORDER: ICD-10-CM

## 2022-09-12 DIAGNOSIS — E55.9 VITAMIN D DEFICIENCY: ICD-10-CM

## 2022-09-12 DIAGNOSIS — M19.90 ARTHRITIS: ICD-10-CM

## 2022-09-12 DIAGNOSIS — R73.9 HYPERGLYCEMIA: ICD-10-CM

## 2022-09-12 PROCEDURE — 99214 OFFICE O/P EST MOD 30 MIN: CPT | Performed by: INTERNAL MEDICINE

## 2022-09-12 RX ORDER — LEVOTHYROXINE SODIUM 0.07 MG/1
75 TABLET ORAL DAILY
Qty: 30 TABLET | Refills: 5 | Status: SHIPPED | OUTPATIENT
Start: 2022-09-12 | End: 2023-02-08 | Stop reason: SDUPTHER

## 2022-09-12 RX ORDER — ESCITALOPRAM OXALATE 10 MG/1
10 TABLET ORAL DAILY
Qty: 30 TABLET | Refills: 2 | Status: SHIPPED | OUTPATIENT
Start: 2022-09-12 | End: 2023-02-06 | Stop reason: SDUPTHER

## 2022-09-12 NOTE — ASSESSMENT & PLAN NOTE
Stable--no HI no SI--afraid of it making her too sleepu-cont lexapro 10 mg one daily-declined BH appt--tried in another visit andpt did not give out her birthdate-was paranoid

## 2022-09-12 NOTE — ASSESSMENT & PLAN NOTE
Blood pressure is stable goal is less than 140/90 continue amlodipine 5 mg 1 daily, telmisartan 20 mg 1 daily, and atorvastatin 10 mg daily for cardiovascular disease protection.

## 2022-09-12 NOTE — PROGRESS NOTES
"CHIEF COMPLAINT  Saba Galarza presents to [facility] for follow-up of Labs Only (2 week follow-up with blood work. Pt states she is feeling a lot better today. The medicine she was given has helped her. Pt would like to ask about lexapro and make sure it isn't something she is going to get addicted to. Pt states B/P has been running low at home. ).    HPI  F/u from last visit    Pt feels better because had \"brain storm'-to build a nice property and have everyone have their own house-    Past medical history with history of dysthymia/depression-not seeing  Dr. Howard anymore ,osteoarthritis of the left hip, both knees, hypertension, hyperlipidemia, GERD, pre diabetes, chronic kidney disease, anemia, DDD of the L-spine, and stroke.     Dep-hearing voices-thingks it is her neighbor--no SI no HI--declined  consult again    PFSH reviewed.  ROS-anxiety, depression, arthritis of hands   OBJECTIVE  Vital Signs  Vitals:    09/12/22 1034   BP: 115/73   BP Location: Left arm   Patient Position: Sitting   Cuff Size: Adult   Pulse: 83   Resp: 16   Temp: 98 °F (36.7 °C)   TempSrc: Temporal   SpO2: 97%   Weight: 60.4 kg (133 lb 3.2 oz)   Height: 152.4 cm (60\")      Body mass index is 26.01 kg/m².    Physical Exam  Vitals and nursing note reviewed.   Constitutional:       Appearance: Normal appearance.      Comments: Using cane   HENT:      Head: Normocephalic and atraumatic.   Cardiovascular:      Rate and Rhythm: Normal rate and regular rhythm.   Pulmonary:      Effort: Pulmonary effort is normal.      Breath sounds: Normal breath sounds.   Abdominal:      Palpations: Abdomen is soft.   Musculoskeletal:      Cervical back: Normal range of motion and neck supple.      Comments: Hand deformity-nodules PIP and DIP -lat deviation of hands   Neurological:      General: No focal deficit present.      Mental Status: She is alert and oriented to person, place, and time.          RESULTS REVIEW  No " results found for: PROBNP, BNP  CMP    CMP 4/12/22 7/18/22 9/9/22   Glucose 100 (A) 95 75   BUN 14 14 18   Creatinine 0.91 0.74 0.93   Sodium 140 140 139   Potassium 4.8 3.8 3.8   Chloride 104 104 102   Calcium 8.7 9.5 9.9   Albumin   4.50   Total Bilirubin   0.4   Alkaline Phosphatase   84   AST (SGOT)   17   ALT (SGPT)   12   (A) Abnormal value            CBC w/diff    CBC w/Diff 4/4/22 9/9/22   WBC 9.85 8.28   RBC 5.21 4.78   Hemoglobin 15.7 14.5   Hematocrit 47.7 (A) 41.9   MCV 91.6 87.7   MCH 30.1 30.3   MCHC 32.9 34.6   RDW 12.5 12.5   Platelets 307 297   Neutrophil Rel % 52.5 52.4   Immature Granulocyte Rel % 0.1 0.4   Lymphocyte Rel % 32.3 32.1   Monocyte Rel % 8.4 9.5   Eosinophil Rel % 5.3 4.3   Basophil Rel % 1.4 1.3   (A) Abnormal value             Lipid Panel    Lipid Panel 4/4/22 9/9/22   Total Cholesterol 132 113   Triglycerides 165 (A) 71   HDL Cholesterol 42 42   VLDL Cholesterol 28 15   LDL Cholesterol  62 56   LDL/HDL Ratio 1.36 1.35   (A) Abnormal value             Lab Results   Component Value Date    TSH 1.190 09/09/2022    TSH 3.660 07/18/2022    TSH 3.390 04/04/2022      Lab Results   Component Value Date    FREET4 1.46 09/09/2022    FREET4 1.53 07/18/2022    FREET4 1.04 01/20/2022      A1C Last 3 Results    HGBA1C Last 3 Results 4/12/22 7/18/22   Hemoglobin A1C 5.90 (A) 5.90 (A)   (A) Abnormal value             Lab Results   Component Value Date    [id number] 525 09/09/2022    ILVD35SJ 39.4 09/09/2022    MG 2.1 09/09/2022        No Images in the past 120 days found..             ASSESSMENT & PLAN  Diagnoses and all orders for this visit:    1. Primary hypertension (Primary)  Comments:  only taking amlodipine 5 mg--BP good--if SBP <110-take only 1/2 tablet of the amlodipine 5 mg  Assessment & Plan:  Blood pressure is stable goal is less than 140/90 continue amlodipine 5 mg 1 daily, telmisartan 20 mg 1 daily, and atorvastatin 10 mg daily for cardiovascular disease protection.    Orders:  -      Comprehensive Metabolic Panel; Future  -     Vitamin D 25 Hydroxy; Future  -     Vitamin B12; Future  -     Folate; Future  -     TSH+Free T4; Future  -     Lipid Panel; Future  -     CBC & Differential; Future  -     Magnesium; Future    2. Moderate episode of recurrent major depressive disorder (HCC)  Assessment & Plan:  Stable--no HI no SI--afraid of it making her too sleepu-cont lexapro 10 mg one daily-declined BH appt--tried in another visit andpt did not give out her birthdate-was paranoid    Orders:  -     escitalopram (Lexapro) 10 MG tablet; Take 1 tablet by mouth Daily.  Dispense: 30 tablet; Refill: 2    3. Hypothyroidism due to Hashimoto's thyroiditis  Comments:  Pt made appt with Aleksandr for 11/2022-Dr denis-with [facility]  Assessment & Plan:  Stable thyroid function tests.  Refill Synthroid 75 mcg once a day brand-name.    Orders:  -     levothyroxine (Synthroid) 75 MCG tablet; Take 1 tablet by mouth Daily.  Dispense: 30 tablet; Refill: 5    4. Arthritis  Comments:  of hands-refill diclofenac gel prn    Orders:  -     Diclofenac Sodium (VOLTAREN) 1 % gel gel; Apply 4 g topically to the appropriate area as directed 4 (Four) Times a Day As Needed (350).  Dispense: 350 g; Refill: 2    5. Postmenopausal osteoporosis  -     DEXA Bone Density Axial; Future  -     Diclofenac Sodium (VOLTAREN) 1 % gel gel; Apply 4 g topically to the appropriate area as directed 4 (Four) Times a Day As Needed (350).  Dispense: 350 g; Refill: 2    6. Hyperglycemia  -     Hemoglobin A1c; Future    7. Vitamin D deficiency  -     Vitamin D 25 Hydroxy; Future        FOLLOW UP  Return in about 3 months (around 12/12/2022).    Patient was given instructions and counseling regarding her condition or for health maintenance advice. Please see specific information pulled into the AVS if appropriate.

## 2022-09-16 ENCOUNTER — TELEPHONE (OUTPATIENT)
Dept: INTERNAL MEDICINE | Facility: CLINIC | Age: 87
End: 2022-09-16

## 2022-09-16 RX ORDER — POTASSIUM CHLORIDE 20 MEQ/1
20 TABLET, EXTENDED RELEASE ORAL DAILY
Qty: 90 TABLET | Refills: 1 | Status: SHIPPED | OUTPATIENT
Start: 2022-09-16

## 2022-09-16 NOTE — TELEPHONE ENCOUNTER
Caller: Michell Saba CALABRESE    Relationship: Self    Best call back number: [phone]    Requested Prescriptions:   Requested Prescriptions     Pending Prescriptions Disp Refills   • potassium chloride (K-DUR,KLOR-CON) 20 MEQ CR tablet          Pharmacy where request should be sent: [facility][id number] - [place], [place] - [address] [facility] AT [facility] OF [address] & [place] - [phone] [facility] [phone] FX     Additional details provided by patient: PATIENT STATES THAT THE PHARMACY WILL ONLY FILL THE LIQUID FORM OF THIS MEDICATION AND NEEDS A NEW ORDER SENT OVER.     PATIENT STATES SHE TOOK HER LAST DOSE ON 9.12.22.    Does the patient have less than a 3 day supply:  [x] Yes  [] No    Navid Pang Rep   09/16/22 15:35 EDT

## 2022-09-27 ENCOUNTER — TELEPHONE (OUTPATIENT)
Dept: INTERNAL MEDICINE | Facility: CLINIC | Age: 87
End: 2022-09-27

## 2022-09-27 DIAGNOSIS — E87.6 HYPOKALEMIA: Primary | ICD-10-CM

## 2022-09-27 RX ORDER — POTASSIUM CHLORIDE 20MEQ/15ML
20 LIQUID (ML) ORAL DAILY
Qty: 4450 ML | Refills: 2 | Status: SHIPPED | OUTPATIENT
Start: 2022-09-27 | End: 2023-03-14

## 2022-09-29 ENCOUNTER — APPOINTMENT (OUTPATIENT)
Dept: BONE DENSITY | Facility: HOSPITAL | Age: 87
End: 2022-09-29

## 2022-12-13 ENCOUNTER — OFFICE VISIT (OUTPATIENT)
Dept: INTERNAL MEDICINE | Facility: CLINIC | Age: 87
End: 2022-12-13

## 2022-12-13 VITALS
TEMPERATURE: 97.5 F | OXYGEN SATURATION: 97 % | WEIGHT: 135.2 LBS | DIASTOLIC BLOOD PRESSURE: 74 MMHG | HEART RATE: 74 BPM | HEIGHT: 60 IN | BODY MASS INDEX: 26.55 KG/M2 | SYSTOLIC BLOOD PRESSURE: 126 MMHG

## 2022-12-13 DIAGNOSIS — E53.8 VITAMIN B 12 DEFICIENCY: ICD-10-CM

## 2022-12-13 DIAGNOSIS — E55.9 VITAMIN D DEFICIENCY: ICD-10-CM

## 2022-12-13 DIAGNOSIS — E11.65 TYPE 2 DIABETES MELLITUS WITH HYPERGLYCEMIA, WITHOUT LONG-TERM CURRENT USE OF INSULIN: ICD-10-CM

## 2022-12-13 DIAGNOSIS — E03.8 HYPOTHYROIDISM DUE TO HASHIMOTO'S THYROIDITIS: ICD-10-CM

## 2022-12-13 DIAGNOSIS — F32.0 CURRENT MILD EPISODE OF MAJOR DEPRESSIVE DISORDER, UNSPECIFIED WHETHER RECURRENT: ICD-10-CM

## 2022-12-13 DIAGNOSIS — L65.9 HAIR LOSS: ICD-10-CM

## 2022-12-13 DIAGNOSIS — E06.3 HYPOTHYROIDISM DUE TO HASHIMOTO'S THYROIDITIS: ICD-10-CM

## 2022-12-13 DIAGNOSIS — I10 PRIMARY HYPERTENSION: Primary | ICD-10-CM

## 2022-12-13 DIAGNOSIS — E78.00 PURE HYPERCHOLESTEROLEMIA: ICD-10-CM

## 2022-12-13 PROCEDURE — 99214 OFFICE O/P EST MOD 30 MIN: CPT | Performed by: INTERNAL MEDICINE

## 2022-12-13 RX ORDER — AMLODIPINE BESYLATE 5 MG/1
5 TABLET ORAL DAILY
Qty: 90 TABLET | Refills: 1 | Status: SHIPPED | OUTPATIENT
Start: 2022-12-13

## 2022-12-13 NOTE — PROGRESS NOTES
CHIEF COMPLAINT  Saba Galarza presents to [facility] for follow-up of Follow-up (3 month follow-up. Pt has not had blood work done. Pt has been staying active and keeps herself busy. Pt is still taking lexapro and states it has helped her a lot. ) and Hypertension (Follow-up blood pressure. Pt states she has been checking blood pressures at home and states blood pressures are all over the place. Sometimes blood pressure runs high and sometimes runs low. ).    HPI       86 yo female here for 3 month check up    Hypothyroid/hashimotos-saw Aleksandr Newby 11/2022--for labs TSH FT4, Thyroid peroxidase Ab rec-f/u 6 months    Past medical history with history of dysthymia/depression-not seeing  Dr. Howard anymore ,osteoarthritis of the left hip, both knees, hypertension, hyperlipidemia, GERD, pre diabetes, chronic kidney disease, anemia, DDD of the L-spine, and stroke.     Dep-hearing voices-thinks it is her neighbor--no SI no HI--declined  consult again    Current Outpatient Medications:   •  acetaminophen (TYLENOL) 325 MG tablet, Take 650 mg by mouth Every 6 (Six) Hours As Needed for Mild Pain ., Disp: , Rfl:   •  Aspirin Low Dose 81 MG EC tablet, , Disp: , Rfl:   •  atorvastatin (LIPITOR) 10 MG tablet, Take 1 tablet by mouth Daily., Disp: 90 tablet, Rfl: 1  •  calcium carbonate (OS-COURTNEY) 1250 (500 Ca) MG tablet, Take 1 tablet by mouth Daily., Disp: , Rfl:   •  calcium carbonate-cholecalciferol 500-400 MG-UNIT tablet tablet, Take  by mouth Daily., Disp: , Rfl:   •  cetirizine (zyrTEC) 10 MG tablet, Take 10 mg by mouth Daily., Disp: , Rfl:   •  cholecalciferol (VITAMIN D3) 25 MCG (1000 UT) tablet, Take 1,000 Units by mouth Daily., Disp: , Rfl:   •  Diclofenac Sodium (VOLTAREN) 1 % gel gel, Apply 4 g topically to the appropriate area as directed 4 (Four) Times a Day As Needed (350)., Disp: 350 g, Rfl: 2  •  escitalopram (Lexapro) 10 MG tablet, Take 1 tablet by mouth Daily., Disp: 30  "tablet, Rfl: 2  •  fluticasone (FLONASE) 50 MCG/ACT nasal spray, , Disp: , Rfl:   •  Glucosamine 500 MG capsule, Take  by mouth., Disp: , Rfl:   •  levothyroxine (Synthroid) 75 MCG tablet, Take 1 tablet by mouth Daily., Disp: 30 tablet, Rfl: 5  •  methocarbamol (ROBAXIN) 500 MG tablet, Take 500 mg by mouth 4 (Four) Times a Day., Disp: , Rfl:   •  multivitamin with minerals tablet tablet, Take 1 tablet by mouth Daily., Disp: , Rfl:   •  potassium chloride (K-DUR,KLOR-CON) 20 MEQ CR tablet, Take 1 tablet by mouth Daily., Disp: 90 tablet, Rfl: 1  •  potassium chloride (KAYCIEL) 20 mEq/15 mL solution, Take 15 mL by mouth Daily., Disp: 4450 mL, Rfl: 2  •  telmisartan (MICARDIS) 20 MG tablet, , Disp: , Rfl:   •  tolterodine LA (DETROL LA) 4 MG 24 hr capsule, Take 1 capsule by mouth Daily., Disp: 90 capsule, Rfl: 1  •  Vitamin D, Cholecalciferol, (CHOLECALCIFEROL) 10 MCG (400 UNIT) tablet, Take 400 Units by mouth Daily., Disp: , Rfl:   •  amLODIPine (NORVASC) 5 MG tablet, Take 1 tablet by mouth Daily., Disp: 90 tablet, Rfl: 1   PFSH reviewed.  ROS -hair loss, no constipaiton    OBJECTIVE  Vital Signs  Vitals:    12/13/22 1516   BP: 126/74   BP Location: Left arm   Patient Position: Sitting   Cuff Size: Adult   Pulse: 74   Temp: 97.5 °F (36.4 °C)   TempSrc: Temporal   SpO2: 97%   Weight: 61.3 kg (135 lb 3.2 oz)   Height: 152.4 cm (60\")      Body mass index is 26.4 kg/m².    Physical Exam  Vitals and nursing note reviewed.   Constitutional:       Appearance: Normal appearance.   HENT:      Head: Normocephalic and atraumatic.   Cardiovascular:      Rate and Rhythm: Normal rate and regular rhythm.   Pulmonary:      Effort: Pulmonary effort is normal.      Breath sounds: Normal breath sounds.   Abdominal:      Palpations: Abdomen is soft.   Musculoskeletal:      Cervical back: Normal range of motion and neck supple.   Neurological:      General: No focal deficit present.      Mental Status: She is alert and oriented to person, " place, and time.          RESULTS REVIEW  No results found for: PROBNP, BNP  CMP    CMP 4/12/22 7/18/22 9/9/22   Glucose 100 (A) 95 75   BUN 14 14 18   Creatinine 0.91 0.74 0.93   Sodium 140 140 139   Potassium 4.8 3.8 3.8   Chloride 104 104 102   Calcium 8.7 9.5 9.9   Albumin   4.50   Total Bilirubin   0.4   Alkaline Phosphatase   84   AST (SGOT)   17   ALT (SGPT)   12   (A) Abnormal value            CBC w/diff    CBC w/Diff 4/4/22 9/9/22   WBC 9.85 8.28   RBC 5.21 4.78   Hemoglobin 15.7 14.5   Hematocrit 47.7 (A) 41.9   MCV 91.6 87.7   MCH 30.1 30.3   MCHC 32.9 34.6   RDW 12.5 12.5   Platelets 307 297   Neutrophil Rel % 52.5 52.4   Immature Granulocyte Rel % 0.1 0.4   Lymphocyte Rel % 32.3 32.1   Monocyte Rel % 8.4 9.5   Eosinophil Rel % 5.3 4.3   Basophil Rel % 1.4 1.3   (A) Abnormal value             Lipid Panel    Lipid Panel 4/4/22 9/9/22   Total Cholesterol 132 113   Triglycerides 165 (A) 71   HDL Cholesterol 42 42   VLDL Cholesterol 28 15   LDL Cholesterol  62 56   LDL/HDL Ratio 1.36 1.35   (A) Abnormal value             Lab Results   Component Value Date    TSH 1.190 09/09/2022    TSH 3.660 07/18/2022    TSH 3.390 04/04/2022      Lab Results   Component Value Date    FREET4 1.46 09/09/2022    FREET4 1.53 07/18/2022    FREET4 1.04 01/20/2022      A1C Last 3 Results    HGBA1C Last 3 Results 4/12/22 7/18/22   Hemoglobin A1C 5.90 (A) 5.90 (A)   (A) Abnormal value             Lab Results   Component Value Date    EUMXXDBV90 525 09/09/2022    BSOM30IX 39.4 09/09/2022    MG 2.1 09/09/2022        No Images in the past 120 days found..             ASSESSMENT & PLAN  Diagnoses and all orders for this visit:    1. Primary hypertension (Primary)  Assessment & Plan:  Blood pressure is stable goal is less than 140/90 continue amlodipine 5 mg 1 daily, telmisartan 20 mg 1 daily, and atorvastatin 10 mg daily for cardiovascular disease protection.    Orders:  -     amLODIPine (NORVASC) 5 MG tablet; Take 1 tablet by mouth  Daily.  Dispense: 90 tablet; Refill: 1  -     Comprehensive Metabolic Panel; Future  -     Lipid Panel; Future  -     TSH+Free T4; Future  -     T3, Free; Future  -     Vitamin B12; Future  -     Folate; Future  -     Magnesium; Future  -     Vitamin D,25-Hydroxy; Future  -     CBC & Differential; Future    2. Pure hypercholesterolemia  -     Comprehensive Metabolic Panel; Future  -     Lipid Panel; Future    3. Hypothyroidism due to Hashimoto's thyroiditis  -     TSH+Free T4; Future  -     T3, Free; Future    4. Type 2 diabetes mellitus with hyperglycemia, without long-term current use of insulin ([facility])  -     Hemoglobin A1c; Future    5. Hair loss  -     Ambulatory Referral to Dermatology    6. Vitamin D deficiency  -     Vitamin D,25-Hydroxy; Future    7. Vitamin B 12 deficiency  -     Vitamin B12; Future  -     Folate; Future    8. Current mild episode of major depressive disorder, unspecified whether recurrent ([facility])  Assessment & Plan:  Stable--no HI no SI--afraid of it making her too sleepy-cont lexapro 10 mg one daily-declined  appt--tried in another visit andpt did not give out her birthdate-was paranoid  Denies /psych consult          FOLLOW UP  Return in about 3 months (around 3/13/2023) for Recheck.    Patient was given instructions and counseling regarding her condition or for health maintenance advice. Please see specific information pulled into the AVS if appropriate.

## 2022-12-13 NOTE — ASSESSMENT & PLAN NOTE
Stable--no HI no SI--afraid of it making her too sleepy-cont lexapro 10 mg one daily-declined  appt--tried in another visit andpt did not give out her birthdate-was paranoid  Denies /psych consult

## 2023-02-06 DIAGNOSIS — F33.1 MODERATE EPISODE OF RECURRENT MAJOR DEPRESSIVE DISORDER: ICD-10-CM

## 2023-02-06 RX ORDER — ESCITALOPRAM OXALATE 10 MG/1
10 TABLET ORAL DAILY
Qty: 30 TABLET | Refills: 2 | Status: SHIPPED | OUTPATIENT
Start: 2023-02-06

## 2023-02-06 NOTE — TELEPHONE ENCOUNTER
Caller: Saba Galarza    Relationship: Self    Best call back number: [phone]     Requested Prescriptions:   Requested Prescriptions     Pending Prescriptions Disp Refills   • escitalopram (Lexapro) 10 MG tablet 30 tablet 2     Sig: Take 1 tablet by mouth Daily.        Pharmacy where request should be sent: [facility] #[id number] - [place], [place] - [address] [facility] AT [facility] OF [address] [facility] & [place] - [phone] [facility] [phone] FX     Additional details provided by patient: PATIENT IS NEEDING A REFILL.    Does the patient have less than a 3 day supply:  [x] Yes  [] No    Would you like a call back once the refill request has been completed: [x] Yes [] No    If the office needs to give you a call back, can they leave a voicemail: [x] Yes [] No    Navid Tidwell Rep   02/06/23 11:19 EST

## 2023-02-08 ENCOUNTER — TELEPHONE (OUTPATIENT)
Dept: INTERNAL MEDICINE | Facility: CLINIC | Age: 88
End: 2023-02-08
Payer: MEDICARE

## 2023-02-08 DIAGNOSIS — E03.8 HYPOTHYROIDISM DUE TO HASHIMOTO'S THYROIDITIS: ICD-10-CM

## 2023-02-08 DIAGNOSIS — E06.3 HYPOTHYROIDISM DUE TO HASHIMOTO'S THYROIDITIS: ICD-10-CM

## 2023-02-08 RX ORDER — LEVOTHYROXINE SODIUM 0.07 MG/1
75 TABLET ORAL DAILY
Qty: 30 TABLET | Refills: 5 | Status: SHIPPED | OUTPATIENT
Start: 2023-02-08 | End: 2023-03-17 | Stop reason: SDUPTHER

## 2023-03-13 ENCOUNTER — LAB (OUTPATIENT)
Dept: LAB | Facility: HOSPITAL | Age: 88
End: 2023-03-13
Payer: MEDICARE

## 2023-03-13 DIAGNOSIS — E03.8 HYPOTHYROIDISM DUE TO HASHIMOTO'S THYROIDITIS: ICD-10-CM

## 2023-03-13 DIAGNOSIS — E11.65 TYPE 2 DIABETES MELLITUS WITH HYPERGLYCEMIA, WITHOUT LONG-TERM CURRENT USE OF INSULIN: ICD-10-CM

## 2023-03-13 DIAGNOSIS — I10 PRIMARY HYPERTENSION: ICD-10-CM

## 2023-03-13 DIAGNOSIS — E06.3 HYPOTHYROIDISM DUE TO HASHIMOTO'S THYROIDITIS: ICD-10-CM

## 2023-03-13 DIAGNOSIS — E53.8 VITAMIN B 12 DEFICIENCY: ICD-10-CM

## 2023-03-13 DIAGNOSIS — E55.9 VITAMIN D DEFICIENCY: ICD-10-CM

## 2023-03-13 DIAGNOSIS — E78.00 PURE HYPERCHOLESTEROLEMIA: ICD-10-CM

## 2023-03-13 LAB
25(OH)D3 SERPL-MCNC: 28.9 NG/ML (ref 30–100)
ALBUMIN SERPL-MCNC: 4.4 G/DL (ref 3.5–5.2)
ALBUMIN/GLOB SERPL: 1.5 G/DL
ALP SERPL-CCNC: 98 U/L (ref 39–117)
ALT SERPL W P-5'-P-CCNC: 17 U/L (ref 1–33)
ANION GAP SERPL CALCULATED.3IONS-SCNC: 9 MMOL/L (ref 5–15)
AST SERPL-CCNC: 17 U/L (ref 1–32)
BASOPHILS # BLD AUTO: 0.1 10*3/MM3 (ref 0–0.2)
BASOPHILS NFR BLD AUTO: 1.4 % (ref 0–1.5)
BILIRUB SERPL-MCNC: 0.2 MG/DL (ref 0–1.2)
BUN SERPL-MCNC: 20 MG/DL (ref 8–23)
BUN/CREAT SERPL: 25 (ref 7–25)
CALCIUM SPEC-SCNC: 9.7 MG/DL (ref 8.6–10.5)
CHLORIDE SERPL-SCNC: 107 MMOL/L (ref 98–107)
CHOLEST SERPL-MCNC: 109 MG/DL (ref 0–200)
CO2 SERPL-SCNC: 24 MMOL/L (ref 22–29)
CREAT SERPL-MCNC: 0.8 MG/DL (ref 0.57–1)
DEPRECATED RDW RBC AUTO: 40.4 FL (ref 37–54)
EGFRCR SERPLBLD CKD-EPI 2021: 72.3 ML/MIN/1.73
EOSINOPHIL # BLD AUTO: 0.37 10*3/MM3 (ref 0–0.4)
EOSINOPHIL NFR BLD AUTO: 5 % (ref 0.3–6.2)
ERYTHROCYTE [DISTWIDTH] IN BLOOD BY AUTOMATED COUNT: 12.6 % (ref 12.3–15.4)
FOLATE SERPL-MCNC: 17.5 NG/ML (ref 4.78–24.2)
GLOBULIN UR ELPH-MCNC: 2.9 GM/DL
GLUCOSE SERPL-MCNC: 103 MG/DL (ref 65–99)
HBA1C MFR BLD: 6.2 % (ref 4.8–5.6)
HCT VFR BLD AUTO: 42.2 % (ref 34–46.6)
HDLC SERPL-MCNC: 41 MG/DL (ref 40–60)
HGB BLD-MCNC: 14.4 G/DL (ref 12–15.9)
IMM GRANULOCYTES # BLD AUTO: 0.01 10*3/MM3 (ref 0–0.05)
IMM GRANULOCYTES NFR BLD AUTO: 0.1 % (ref 0–0.5)
LDLC SERPL CALC-MCNC: 49 MG/DL (ref 0–100)
LDLC/HDLC SERPL: 1.19 {RATIO}
LYMPHOCYTES # BLD AUTO: 2.46 10*3/MM3 (ref 0.7–3.1)
LYMPHOCYTES NFR BLD AUTO: 33.5 % (ref 19.6–45.3)
MAGNESIUM SERPL-MCNC: 2.1 MG/DL (ref 1.6–2.4)
MCH RBC QN AUTO: 30.3 PG (ref 26.6–33)
MCHC RBC AUTO-ENTMCNC: 34.1 G/DL (ref 31.5–35.7)
MCV RBC AUTO: 88.8 FL (ref 79–97)
MONOCYTES # BLD AUTO: 0.76 10*3/MM3 (ref 0.1–0.9)
MONOCYTES NFR BLD AUTO: 10.4 % (ref 5–12)
NEUTROPHILS NFR BLD AUTO: 3.64 10*3/MM3 (ref 1.7–7)
NEUTROPHILS NFR BLD AUTO: 49.6 % (ref 42.7–76)
NRBC BLD AUTO-RTO: 0 /100 WBC (ref 0–0.2)
PLATELET # BLD AUTO: 309 10*3/MM3 (ref 140–450)
PMV BLD AUTO: 12.2 FL (ref 6–12)
POTASSIUM SERPL-SCNC: 4.1 MMOL/L (ref 3.5–5.2)
PROT SERPL-MCNC: 7.3 G/DL (ref 6–8.5)
RBC # BLD AUTO: 4.75 10*6/MM3 (ref 3.77–5.28)
SODIUM SERPL-SCNC: 140 MMOL/L (ref 136–145)
T3FREE SERPL-MCNC: 2.93 PG/ML (ref 2–4.4)
T4 FREE SERPL-MCNC: 1.24 NG/DL (ref 0.93–1.7)
TRIGL SERPL-MCNC: 97 MG/DL (ref 0–150)
TSH SERPL DL<=0.05 MIU/L-ACNC: 3.13 UIU/ML (ref 0.27–4.2)
VIT B12 BLD-MCNC: 667 PG/ML (ref 211–946)
VLDLC SERPL-MCNC: 19 MG/DL (ref 5–40)
WBC NRBC COR # BLD: 7.34 10*3/MM3 (ref 3.4–10.8)

## 2023-03-13 PROCEDURE — 80061 LIPID PANEL: CPT

## 2023-03-13 PROCEDURE — 84439 ASSAY OF FREE THYROXINE: CPT

## 2023-03-13 PROCEDURE — 85025 COMPLETE CBC W/AUTO DIFF WBC: CPT

## 2023-03-13 PROCEDURE — 83735 ASSAY OF MAGNESIUM: CPT

## 2023-03-13 PROCEDURE — 36415 COLL VENOUS BLD VENIPUNCTURE: CPT

## 2023-03-13 PROCEDURE — 82306 VITAMIN D 25 HYDROXY: CPT

## 2023-03-13 PROCEDURE — 82607 VITAMIN B-12: CPT

## 2023-03-13 PROCEDURE — 82746 ASSAY OF FOLIC ACID SERUM: CPT

## 2023-03-13 PROCEDURE — 83036 HEMOGLOBIN GLYCOSYLATED A1C: CPT

## 2023-03-13 PROCEDURE — 80053 COMPREHEN METABOLIC PANEL: CPT

## 2023-03-13 PROCEDURE — 84481 FREE ASSAY (FT-3): CPT

## 2023-03-13 PROCEDURE — 84443 ASSAY THYROID STIM HORMONE: CPT

## 2023-03-14 ENCOUNTER — OFFICE VISIT (OUTPATIENT)
Dept: INTERNAL MEDICINE | Facility: CLINIC | Age: 88
End: 2023-03-14
Payer: MEDICARE

## 2023-03-14 VITALS
WEIGHT: 133.4 LBS | SYSTOLIC BLOOD PRESSURE: 128 MMHG | BODY MASS INDEX: 26.19 KG/M2 | DIASTOLIC BLOOD PRESSURE: 78 MMHG | TEMPERATURE: 97.7 F | OXYGEN SATURATION: 95 % | HEIGHT: 60 IN | HEART RATE: 88 BPM

## 2023-03-14 DIAGNOSIS — F32.9 REACTIVE DEPRESSION: ICD-10-CM

## 2023-03-14 DIAGNOSIS — I10 PRIMARY HYPERTENSION: Primary | ICD-10-CM

## 2023-03-14 DIAGNOSIS — R73.03 PREDIABETES: ICD-10-CM

## 2023-03-14 DIAGNOSIS — E55.9 VITAMIN D DEFICIENCY: ICD-10-CM

## 2023-03-14 PROCEDURE — 1160F RVW MEDS BY RX/DR IN RCRD: CPT | Performed by: INTERNAL MEDICINE

## 2023-03-14 PROCEDURE — 3078F DIAST BP <80 MM HG: CPT | Performed by: INTERNAL MEDICINE

## 2023-03-14 PROCEDURE — 3074F SYST BP LT 130 MM HG: CPT | Performed by: INTERNAL MEDICINE

## 2023-03-14 PROCEDURE — 99214 OFFICE O/P EST MOD 30 MIN: CPT | Performed by: INTERNAL MEDICINE

## 2023-03-14 PROCEDURE — 1159F MED LIST DOCD IN RCRD: CPT | Performed by: INTERNAL MEDICINE

## 2023-03-14 RX ORDER — ERGOCALCIFEROL 1.25 MG/1
50000 CAPSULE ORAL WEEKLY
Qty: 5 CAPSULE | Refills: 0 | Status: SHIPPED | OUTPATIENT
Start: 2023-03-14

## 2023-03-14 RX ORDER — MULTIVIT-MIN/IRON/FOLIC ACID/K 18-600-40
1 CAPSULE ORAL DAILY
Qty: 90 CAPSULE | Refills: 1 | Status: SHIPPED | OUTPATIENT
Start: 2023-03-14

## 2023-03-14 NOTE — ASSESSMENT & PLAN NOTE
Blood pressure is stable goal is less than 128/78 continue amlodipine 5 mg 1 daily, telmisartan 20 mg 1 daily, and atorvastatin 10 mg daily for cardiovascular disease protection.

## 2023-03-14 NOTE — PROGRESS NOTES
CHIEF COMPLAINT  Saba Galarza presents to [facility] for follow-up of Follow-up and Labs Only (Were done for this visit. Patient says that she feels like nothing needs to change.  Patient says that she would be better off with benadryl instead of zrtec.).    HPI  87-year-old female here for 3-month checkup.  prefers Benadryl instead of Zyrtec.    Past medical history with history of dysthymia/depression-not seeing  Dr. Howard anymore ,osteoarthritis of the left hip, both knees, hypertension, hyperlipidemia, GERD, pre diabetes, chronic kidney disease, anemia, DDD of the L-spine, and stroke.     Dep-hearing voices-chronic -not new -thinks it is her neighbor--no SI no HI--declined  consult again         Current Outpatient Medications:   •  amLODIPine (NORVASC) 5 MG tablet, Take 1 tablet by mouth Daily., Disp: 90 tablet, Rfl: 1  •  Aspirin Low Dose 81 MG EC tablet, , Disp: , Rfl:   •  atorvastatin (LIPITOR) 10 MG tablet, Take 1 tablet by mouth Daily., Disp: 90 tablet, Rfl: 1  •  calcium carbonate-cholecalciferol 500-400 MG-UNIT tablet tablet, Take  by mouth Daily., Disp: , Rfl:   •  cetirizine (zyrTEC) 10 MG tablet, Take 1 tablet by mouth Daily., Disp: , Rfl:   •  Diclofenac Sodium (VOLTAREN) 1 % gel gel, Apply 4 g topically to the appropriate area as directed 4 (Four) Times a Day As Needed (350)., Disp: 350 g, Rfl: 2  •  escitalopram (Lexapro) 10 MG tablet, Take 1 tablet by mouth Daily., Disp: 30 tablet, Rfl: 2  •  Glucosamine 500 MG capsule, Take  by mouth., Disp: , Rfl:   •  levothyroxine (Synthroid) 75 MCG tablet, Take 1 tablet by mouth Daily., Disp: 30 tablet, Rfl: 5  •  methocarbamol (ROBAXIN) 500 MG tablet, Take 1 tablet by mouth 4 (Four) Times a Day., Disp: , Rfl:   •  multivitamin with minerals tablet tablet, Take 1 tablet by mouth Daily., Disp: , Rfl:   •  potassium chloride (K-DUR,KLOR-CON) 20 MEQ CR tablet, Take 1 tablet by mouth Daily., Disp: 90 tablet, Rfl: 1  •   "telmisartan (MICARDIS) 20 MG tablet, , Disp: , Rfl:   •  tolterodine LA (DETROL LA) 4 MG 24 hr capsule, Take 1 capsule by mouth Daily., Disp: 90 capsule, Rfl: 1  •  vitamin D (ERGOCALCIFEROL) 1.25 MG (82283 UT) capsule capsule, Take 1 capsule by mouth 1 (One) Time Per Week., Disp: 5 capsule, Rfl: 0  •  Vitamin D, Cholecalciferol, 50 MCG (2000 UT) capsule, Take 1 capsule by mouth Daily., Disp: 90 capsule, Rfl: 1   PFSH reviewed.  ROS -no constipation    OBJECTIVE  Vital Signs  Vitals:    03/14/23 1529   BP: 128/78   BP Location: Left arm   Patient Position: Sitting   Cuff Size: Small Adult   Pulse: 88   Temp: 97.7 °F (36.5 °C)   SpO2: 95%   Weight: 60.5 kg (133 lb 6.4 oz)   Height: 152.4 cm (60\")      Body mass index is 26.05 kg/m².    Physical Exam  Vitals and nursing note reviewed.   Constitutional:       Appearance: Normal appearance.   HENT:      Head: Normocephalic and atraumatic.   Cardiovascular:      Rate and Rhythm: Normal rate and regular rhythm.   Pulmonary:      Effort: Pulmonary effort is normal.      Breath sounds: Normal breath sounds.   Abdominal:      Palpations: Abdomen is soft.   Musculoskeletal:      Cervical back: Normal range of motion and neck supple.   Neurological:      General: No focal deficit present.      Mental Status: She is alert and oriented to person, place, and time.          RESULTS REVIEW  No results found for: PROBNP, BNP  CMP    CMP 7/18/22 9/9/22 3/13/23   Glucose 95 75 103 (A)   BUN 14 18 20   Creatinine 0.74 0.93 0.80   eGFR 79.9 60.7 72.3   Sodium 140 139 140   Potassium 3.8 3.8 4.1   Chloride 104 102 107   Calcium 9.5 9.9 9.7   Total Protein  7.1 7.3   Albumin  4.50 4.4   Globulin  2.6 2.9   Total Bilirubin  0.4 0.2   Alkaline Phosphatase  84 98   AST (SGOT)  17 17   ALT (SGPT)  12 17   Albumin/Globulin Ratio  1.7 1.5   BUN/Creatinine Ratio 18.9 19.4 25.0   Anion Gap 12.5 13.0 9.0   (A) Abnormal value       Comments are available for some flowsheets but are not being " displayed.           CBC w/diff    CBC w/Diff 4/4/22 9/9/22 3/13/23   WBC 9.85 8.28 7.34   RBC 5.21 4.78 4.75   Hemoglobin 15.7 14.5 14.4   Hematocrit 47.7 (A) 41.9 42.2   MCV 91.6 87.7 88.8   MCH 30.1 30.3 30.3   MCHC 32.9 34.6 34.1   RDW 12.5 12.5 12.6   Platelets 307 297 309   Neutrophil Rel % 52.5 52.4 49.6   Immature Granulocyte Rel % 0.1 0.4 0.1   Lymphocyte Rel % 32.3 32.1 33.5   Monocyte Rel % 8.4 9.5 10.4   Eosinophil Rel % 5.3 4.3 5.0   Basophil Rel % 1.4 1.3 1.4   (A) Abnormal value             Lipid Panel    Lipid Panel 4/4/22 9/9/22 3/13/23   Total Cholesterol 132 113 109   Triglycerides 165 (A) 71 97   HDL Cholesterol 42 42 41   VLDL Cholesterol 28 15 19   LDL Cholesterol  62 56 49   LDL/HDL Ratio 1.36 1.35 1.19   (A) Abnormal value             Lab Results   Component Value Date    TSH 3.130 03/13/2023    TSH 1.190 09/09/2022    TSH 3.660 07/18/2022      Lab Results   Component Value Date    FREET4 1.24 03/13/2023    FREET4 1.00 12/14/2022    FREET4 1.46 09/09/2022      A1C Last 3 Results    HGBA1C Last 3 Results 4/12/22 7/18/22 3/13/23   Hemoglobin A1C 5.90 (A) 5.90 (A) 6.20 (A)   (A) Abnormal value             Lab Results   Component Value Date    GGHMOMLQ81 667 03/13/2023    VUWP55AD 28.9 (L) 03/13/2023    MG 2.1 03/13/2023        No Images in the past 120 days found..             ASSESSMENT & PLAN  Diagnoses and all orders for this visit:    1. Primary hypertension (Primary)  Assessment & Plan:  Blood pressure is stable goal is less than 128/78 continue amlodipine 5 mg 1 daily, telmisartan 20 mg 1 daily, and atorvastatin 10 mg daily for cardiovascular disease protection.    Orders:  -     vitamin D (ERGOCALCIFEROL) 1.25 MG (03179 UT) capsule capsule; Take 1 capsule by mouth 1 (One) Time Per Week.  Dispense: 5 capsule; Refill: 0  -     Vitamin D, Cholecalciferol, 50 MCG (2000 UT) capsule; Take 1 capsule by mouth Daily.  Dispense: 90 capsule; Refill: 1  -     Comprehensive Metabolic Panel; Future  -      Vitamin B12; Future  -     Folate; Future  -     Vitamin D,25-Hydroxy; Future  -     Magnesium; Future  -     Microalbumin / Creatinine Urine Ratio - Urine, Clean Catch; Future  -     Hemoglobin A1c; Future  -     TSH+Free T4; Future  -     T3, Free; Future    2. Vitamin D deficiency  Comments:  Recheck levels vitamin D today.  Continue vitamin D 50,000 units once a week for the next month for now  Orders:  -     vitamin D (ERGOCALCIFEROL) 1.25 MG (95864 UT) capsule capsule; Take 1 capsule by mouth 1 (One) Time Per Week.  Dispense: 5 capsule; Refill: 0  -     Vitamin D, Cholecalciferol, 50 MCG (2000 UT) capsule; Take 1 capsule by mouth Daily.  Dispense: 90 capsule; Refill: 1  -     Comprehensive Metabolic Panel; Future  -     Vitamin B12; Future  -     Folate; Future  -     Vitamin D,25-Hydroxy; Future  -     Magnesium; Future  -     Microalbumin / Creatinine Urine Ratio - Urine, Clean Catch; Future  -     Hemoglobin A1c; Future  -     TSH+Free T4; Future  -     T3, Free; Future    3. Prediabetes  Comments:  check [id number] low carb diet-lose 5- by next visit  Orders:  -     vitamin D (ERGOCALCIFEROL) 1.25 MG (16384 UT) capsule capsule; Take 1 capsule by mouth 1 (One) Time Per Week.  Dispense: 5 capsule; Refill: 0  -     Vitamin D, Cholecalciferol, 50 MCG (2000 UT) capsule; Take 1 capsule by mouth Daily.  Dispense: 90 capsule; Refill: 1  -     Comprehensive Metabolic Panel; Future  -     Vitamin B12; Future  -     Folate; Future  -     Vitamin D,25-Hydroxy; Future  -     Magnesium; Future  -     Microalbumin / Creatinine Urine Ratio - Urine, Clean Catch; Future  -     Hemoglobin A1c; Future  -     TSH+Free T4; Future  -     T3, Free; Future    4. Reactive depression  Comments:  Declined behavioral health consult.  Continue with Lexapro 10 mg 1 daily  Orders:  -     vitamin D (ERGOCALCIFEROL) 1.25 MG (66271 UT) capsule capsule; Take 1 capsule by mouth 1 (One) Time Per Week.  Dispense: 5 capsule; Refill: 0  -      Vitamin D, Cholecalciferol, 50 MCG (2000 UT) capsule; Take 1 capsule by mouth Daily.  Dispense: 90 capsule; Refill: 1  -     Comprehensive Metabolic Panel; Future  -     Vitamin B12; Future  -     Folate; Future  -     Vitamin D,25-Hydroxy; Future  -     Magnesium; Future  -     Microalbumin / Creatinine Urine Ratio - Urine, Clean Catch; Future  -     Hemoglobin A1c; Future  -     TSH+Free T4; Future  -     T3, Free; Future        FOLLOW UP  Return in about 3 months (around 6/14/2023) for Recheck, Medicare Wellness.    Patient was given instructions and counseling regarding her condition or for health maintenance advice. Please see specific information pulled into the AVS if appropriate.

## 2023-03-15 ENCOUNTER — APPOINTMENT (OUTPATIENT)
Dept: LAB | Facility: HOSPITAL | Age: 88
End: 2023-03-15
Payer: MEDICARE

## 2023-03-15 ENCOUNTER — LAB (OUTPATIENT)
Dept: LAB | Facility: HOSPITAL | Age: 88
End: 2023-03-15
Payer: MEDICARE

## 2023-03-15 DIAGNOSIS — I10 PRIMARY HYPERTENSION: ICD-10-CM

## 2023-03-15 DIAGNOSIS — R73.03 PREDIABETES: ICD-10-CM

## 2023-03-15 DIAGNOSIS — F32.9 REACTIVE DEPRESSION: ICD-10-CM

## 2023-03-15 DIAGNOSIS — E55.9 VITAMIN D DEFICIENCY: ICD-10-CM

## 2023-03-15 PROCEDURE — 82043 UR ALBUMIN QUANTITATIVE: CPT

## 2023-03-15 PROCEDURE — 82570 ASSAY OF URINE CREATININE: CPT

## 2023-03-16 LAB
ALBUMIN UR-MCNC: 1.7 MG/DL
CREAT UR-MCNC: 73.9 MG/DL
MICROALBUMIN/CREAT UR: 23 MG/G

## 2023-03-17 DIAGNOSIS — E06.3 HYPOTHYROIDISM DUE TO HASHIMOTO'S THYROIDITIS: ICD-10-CM

## 2023-03-17 DIAGNOSIS — E03.8 HYPOTHYROIDISM DUE TO HASHIMOTO'S THYROIDITIS: ICD-10-CM

## 2023-03-17 RX ORDER — LEVOTHYROXINE SODIUM 0.07 MG/1
75 TABLET ORAL DAILY
Qty: 30 TABLET | Refills: 5 | Status: SHIPPED | OUTPATIENT
Start: 2023-03-17

## 2023-03-17 NOTE — TELEPHONE ENCOUNTER
Caller: Saba Galarza    Relationship: Self    Best call back number: [phone]    Requested Prescriptions:   Requested Prescriptions     Pending Prescriptions Disp Refills   • levothyroxine (Synthroid) 75 MCG tablet 30 tablet 5     Sig: Take 1 tablet by mouth Daily.        Pharmacy where request should be sent: [facility] #[id number] - [place], [place] - [address] [facility] AT [facility] OF [address] [facility] & [place] - [phone] [facility] [phone] FX     Additional details provided by patient: PATIENT ONLY HAS 3 DAYS LEFT OF MEDICATION    Does the patient have less than a 3 day supply:  [x] Yes  [] No    Would you like a call back once the refill request has been completed: [] Yes [x] No    If the office needs to give you a call back, can they leave a voicemail: [x] Yes [] No    Navid Welch Rep   03/17/23 10:00 EDT

## 2023-04-05 ENCOUNTER — TELEPHONE (OUTPATIENT)
Dept: INTERNAL MEDICINE | Facility: CLINIC | Age: 88
End: 2023-04-05
Payer: MEDICARE

## 2023-04-05 NOTE — TELEPHONE ENCOUNTER
Caller: Saba Galarza    Relationship: Self    Best call back number: [phone]    Caller requesting test results: YES     What test was performed: URINE TEST AND LABS     When was the test performed: 03/16    Where was the test performed: IN OFFICE

## 2023-04-06 ENCOUNTER — TELEPHONE (OUTPATIENT)
Dept: INTERNAL MEDICINE | Facility: CLINIC | Age: 88
End: 2023-04-06
Payer: MEDICARE

## 2023-04-06 NOTE — TELEPHONE ENCOUNTER
Caller: Saba Galarza    Relationship: Self    Best call back number: [phone]    What test was performed: URINE ANALYSIS, BLOOD WORK     When was the test performed: 3.15    Where was the test performed: HOSPITAL

## 2023-04-09 ENCOUNTER — HOSPITAL ENCOUNTER (EMERGENCY)
Facility: HOSPITAL | Age: 88
Discharge: HOME | End: 2023-04-09
Attending: EMERGENCY MEDICINE | Admitting: EMERGENCY MEDICINE
Payer: MEDICARE

## 2023-04-09 VITALS
HEIGHT: 60 IN | DIASTOLIC BLOOD PRESSURE: 75 MMHG | TEMPERATURE: 97.9 F | WEIGHT: 133.38 LBS | HEART RATE: 85 BPM | SYSTOLIC BLOOD PRESSURE: 164 MMHG | BODY MASS INDEX: 26.19 KG/M2 | RESPIRATION RATE: 17 BRPM | OXYGEN SATURATION: 97 %

## 2023-04-09 DIAGNOSIS — L24.4 IRRITANT CONTACT DERMATITIS DUE TO DRUG IN CONTACT WITH SKIN: Primary | ICD-10-CM

## 2023-04-09 PROCEDURE — 99283 EMERGENCY DEPT VISIT LOW MDM: CPT

## 2023-04-09 PROCEDURE — 63710000001 PREDNISONE PER 5 MG: Performed by: EMERGENCY MEDICINE

## 2023-04-09 RX ORDER — PREDNISONE 20 MG/1
40 TABLET ORAL DAILY
Qty: 10 TABLET | Refills: 0 | Status: SHIPPED | OUTPATIENT
Start: 2023-04-09 | End: 2023-04-14

## 2023-04-09 RX ORDER — PREDNISONE 10 MG/1
40 TABLET ORAL ONCE
Status: COMPLETED | OUTPATIENT
Start: 2023-04-09 | End: 2023-04-09

## 2023-04-09 RX ADMIN — PREDNISONE 40 MG: 10 TABLET ORAL at 20:36

## 2023-04-09 NOTE — ED PROVIDER NOTES
Time: 7:51 PM EDT  Date of encounter:  4/9/2023  Independent Historian/Clinical History and Information was obtained by:   Patient  Chief Complaint   Patient presents with   • Rash   • Allergic Reaction       History is limited by: N/A    History of Present Illness:  Patient is a 87 y.o. year old female who presents to the emergency department for evaluation of rash on her skin.      Patient states that she was prescribed fluorouracil 5% cream for precancerous skin cells.  She started applying this on the 24th for 10 days.  Has red spots over her face and forearms and chest.  States the skin feels warm and burning.  Denies fever/chills, nausea/vomiting, body aches.      HPI    Patient Care Team  Primary Care Provider: Dior Meyer MD    Past Medical History:     Allergies   Allergen Reactions   • Penicillins GI Intolerance   • Sulfa Antibiotics GI Intolerance     Past Medical History:   Diagnosis Date   • Anemia, unspecified    • Arthritis    • CKD (chronic kidney disease) stage 3, GFR 30-59 ml/min 09/14/2016   • CVA (cerebral vascular accident)    • DDD (degenerative disc disease), lumbar 01/15/2019   • Depression    • Depression with anxiety    • Diabetes mellitus type 2, controlled    • Disease of thyroid gland    • Forgetfulness    • GERD (gastroesophageal reflux disease)    • Hyperlipemia    • Hyperlipidemia    • Hypertension    • Hyperthyroidism    • Limb swelling    • Low back pain 12/21/2015   • Paranoid delusion    • Paranoid schizophrenia 05/19/2016   • Primary osteoarthritis of both knees 10/06/2015   • Primary osteoarthritis of left hip 01/15/2019   • Seasonal allergies    • Stroke    • Stroke 2013 or 2014     Past Surgical History:   Procedure Laterality Date   • COLONOSCOPY     • DILATATION AND CURETTAGE  1980   • EYE SURGERY      Eye Implant   • TONSILLECTOMY       Family History   Problem Relation Age of Onset   • Hypertension Mother    • Osteoporosis Mother    • Heart disease  Father    • Diabetes Father    • Hypertension Father    • Arthritis Father    • Breast cancer Sister    • Cancer Sister    • Arthritis Sister    • Diabetes Brother    • Diabetes Daughter    • Arthritis Daughter        Home Medications:  Prior to Admission medications    Medication Sig Start Date End Date Taking? Authorizing Provider   amLODIPine (NORVASC) 5 MG tablet Take 1 tablet by mouth Daily. 12/13/22   Dior Meyer MD   Aspirin Low Dose 81 MG EC tablet  3/17/21   Emergency, Nurse Wesley RN   atorvastatin (LIPITOR) 10 MG tablet Take 1 tablet by mouth Daily. 4/11/22   Dior Meyer MD   calcium carbonate-cholecalciferol 500-400 MG-UNIT tablet tablet Take  by mouth Daily.    Emergency, Nurse PAVITHRA Olson   cetirizine (zyrTEC) 10 MG tablet Take 1 tablet by mouth Daily.    Emergency, Nurse PAVITHRA Olson   Diclofenac Sodium (VOLTAREN) 1 % gel gel Apply 4 g topically to the appropriate area as directed 4 (Four) Times a Day As Needed (350). 9/12/22   Dior Meyer MD   escitalopram (Lexapro) 10 MG tablet Take 1 tablet by mouth Daily. 2/6/23   Dior Meyer MD   Glucosamine 500 MG capsule Take  by mouth.    Provider, MD Angela   levothyroxine (Synthroid) 75 MCG tablet Take 1 tablet by mouth Daily. 3/17/23   Dior Meyer MD   methocarbamol (ROBAXIN) 500 MG tablet Take 1 tablet by mouth 4 (Four) Times a Day.    Provider, MD Angela   multivitamin with minerals tablet tablet Take 1 tablet by mouth Daily.    Emergency, Nurse PAVITHRA Olson   potassium chloride (K-DUR,KLOR-CON) 20 MEQ CR tablet Take 1 tablet by mouth Daily. 9/16/22   Dior Meyer MD   telmisartan (MICARDIS) 20 MG tablet  5/5/21   Emergency, Nurse Wesley RN   tolterodine LA (DETROL LA) 4 MG 24 hr capsule Take 1 capsule by mouth Daily. 4/11/22   Dior Meyer MD   vitamin D (ERGOCALCIFEROL) 1.25 MG (15655 UT) capsule capsule Take 1  "capsule by mouth 1 (One) Time Per Week. 3/14/23   Dior Meyer MD   Vitamin D, Cholecalciferol, 50 MCG (2000 UT) capsule Take 1 capsule by mouth Daily. 3/14/23   Dior Meyer MD        Social History:   Social History     Tobacco Use   • Smoking status: Never   • Smokeless tobacco: Never   Vaping Use   • Vaping Use: Never used   Substance Use Topics   • Alcohol use: Never   • Drug use: Never         Review of Systems:  Review of Systems   Skin: Positive for rash and wound.        Physical Exam:  /75 (BP Location: Right arm, Patient Position: Lying)   Pulse 85   Temp 97.9 °F (36.6 °C) (Oral)   Resp 17   Ht 152.4 cm (60\")   Wt 60.5 kg (133 lb 6.1 oz)   LMP  (LMP Unknown)   SpO2 97%   BMI 26.05 kg/m²     General: Awake alert and in no obvious distress    HEENT: Head normocephalic atraumatic, eyes PERRLA EOMI, nose normal, oropharynx normal.  Facial rash present (see skin exam)    Neck: Supple full range of motion, no meningismus, no lymphadenopathy    Heart: Regular rate and rhythm, no murmurs or rubs, 2+ radial pulses bilaterally    Lungs: Clear to auscultation bilaterally without wheezes or crackles, no respiratory distress    Abdomen: Soft, nontender, nondistended, no rebound or guarding    Skin: Warm, dry, patient has scattered erythematous macular rash all over her face and the back of her hands and forearms where she is applied fluorouracil cream as directed.  No vesicles or pustules or target lesions or oral lesions were seen.    Musculoskeletal: Normal range of motion, no lower extremity edema    Neurologic: Oriented x3, no motor deficits no sensory deficits    Psychiatric: Mood appears stable, no psychosis            Procedures:  Procedures      Medical Decision Making:      Comorbidities that affect care:    Cancer    External Notes reviewed:    Previous Clinic Note: I reviewed her recent internal medicine clinic note from 3 weeks ago where she was seen " for her hypertension.      The following orders were placed and all results were independently analyzed by me:  No orders of the defined types were placed in this encounter.      Medications Given in the Emergency Department:  Medications   predniSONE (DELTASONE) tablet 40 mg (has no administration in time range)        ED Course:    The patient was initially evaluated in the triage area where orders were placed. The patient was later dispositioned by Del Abbott MD.      The patient was advised to stay for completion of workup which includes but is not limited to communication of labs and radiological results, reassessment and plan. The patient was advised that leaving prior to disposition by a provider could result in critical findings that are not communicated to the patient.          Labs:    Lab Results (last 24 hours)     ** No results found for the last 24 hours. **           Imaging:    No Radiology Exams Resulted Within Past 24 Hours      Differential Diagnosis and Discussion:      Rash: Differential diagnosis includes but is not limited to sepsis, cellulitis, [place] Spotted Fever, meningitis, meningococcemia, Varicella, Strep infection, dermatitis, allergic reaction, Lyme disease, and toxic shock syndrome.        MDM             This patient is a pleasant 87-year-old female who was directed by her dermatologist to apply a topical fluorouracil cream to the back of her hands and forearms for some precancerous lesion treatment, and it looks like she has done this but also applied it to the similar lesions on her face.    She has multiple areas of skin redness and irritation that looks like some type of irritant or contact dermatitis from the cream.    When I looked up the adverse side effects associated with use of this cream it was 95% positive for causing the similar skin inflammation that I see on this patient.    So essentially this is the expected clinical course from using the dedication as  directed.    I will give her a few days of prednisone to help with the inflammation and I will have her follow-up with her dermatologist, but I do not see anything concerning for infection or anything worrisome today.                  Patient Care Considerations:          Consultants/Shared Management Plan:        Social Determinants of Health:    Patient is independent, reliable, and has access to care.       Disposition and Care Coordination:    Discharged: The patient is suitable and stable for discharge with no need for consideration of observation or admission.    I have explained the patient´s condition, diagnoses and treatment plan based on the information available to me at this time. I have answered questions and addressed any concerns. The patient has a good  understanding of the patient´s diagnosis, condition, and treatment plan as can be expected at this point. The vital signs have been stable. The patient´s condition is stable and appropriate for discharge from the emergency department.      The patient will pursue further outpatient evaluation with the primary care physician or other designated or consulting physician as outlined in the discharge instructions. They are agreeable to this plan of care and follow-up instructions have been explained in detail. The patient has received these instructions in written format and have expressed an understanding of the discharge instructions. The patient is aware that any significant change in condition or worsening of symptoms should prompt an immediate return to this or the closest emergency department or call to 911.  I have explained discharge medications and the need for follow up with the patient/caretakers. This was also printed in the discharge instructions. Patient was discharged with the following medications and follow up:      Medication List      New Prescriptions    predniSONE 20 MG tablet  Commonly known as: DELTASONE  Take 2 tablets by mouth  Daily for 5 days.           Where to Get Your Medications      These medications were sent to [facility] #[id number] - [place], [place] - [address] AT [facility] OF [address] [facility] & [place] - [phone]  - [phone] FX  [address] [place] [address] [place] [address]    Phone: [phone]   · predniSONE 20 MG tablet      Hailey-Oropilla, Dior Ruth MD  [address] [place] [id number] [phone]    Call in 2 days  As needed, If symptoms worsen, for a follow-up appointment       Final diagnoses:   Irritant contact dermatitis due to drug in contact with skin        ED Disposition     ED Disposition   Discharge    Condition   Stable    Comment   --             This medical record created using voice recognition software.           Del Abbott MD  04/09/23 2027

## 2023-04-10 NOTE — DISCHARGE INSTRUCTIONS
The fluorouracil cream you are using is well-known to cause inflammatory skin reaction such as redness and stinging and burning when you apply it.      You are given a dose of prednisone here to reduce the inflammation to the skin and it may help somewhat with the stinging and burning and redness.    Follow-up with the doctor of dermatology who prescribed the cream, but it looks like this is almost the expected course after using the cream for 2 weeks.

## 2023-06-15 ENCOUNTER — TELEPHONE (OUTPATIENT)
Dept: INTERNAL MEDICINE | Facility: CLINIC | Age: 88
End: 2023-06-15
Payer: MEDICARE

## 2023-06-15 DIAGNOSIS — R32 URINARY INCONTINENCE, UNSPECIFIED TYPE: ICD-10-CM

## 2023-06-15 RX ORDER — TOLTERODINE 4 MG/1
4 CAPSULE, EXTENDED RELEASE ORAL DAILY
Qty: 90 CAPSULE | Refills: 1 | Status: SHIPPED | OUTPATIENT
Start: 2023-06-15

## 2023-06-15 NOTE — TELEPHONE ENCOUNTER
Caller: Saba Galarza    Relationship: Self    Best call back number: [phone]     Requested Prescriptions:   Requested Prescriptions     Pending Prescriptions Disp Refills    tolterodine LA (DETROL LA) 4 MG 24 hr capsule 90 capsule 1     Sig: Take 1 capsule by mouth Daily.        Pharmacy where request should be sent: [facility][id number] - [place], [place] - [address] [facility] AT [facility] OF [address] [facility] & [place] - [phone] [facility] [phone] FX     Last office visit with prescribing clinician: 3/14/2023   Last telemedicine visit with prescribing clinician: Visit date not found   Next office visit with prescribing clinician: 6/19/2023     Does the patient have less than a 3 day supply:  [x] Yes  [] No    Would you like a call back once the refill request has been completed: [] Yes [x] No    If the office needs to give you a call back, can they leave a voicemail: [] Yes [x] No    Soco Campbell, PCT   06/15/23 09:07 EDT

## 2023-06-29 ENCOUNTER — TELEPHONE (OUTPATIENT)
Dept: INTERNAL MEDICINE | Facility: CLINIC | Age: 88
End: 2023-06-29

## 2023-06-29 NOTE — TELEPHONE ENCOUNTER
Caller: Saba Galarza    Relationship to patient: Self    Best call back number: [phone]     Patient is needing: PATIENT STATES SHE HAS AN UPCOMING APPOINTMENT AND SHOULD HAVE LAB ORDERS CALLED IN TO BE DONE BEFORE THE APPOINTMENT. PATIENT IS ASKING FOR A CALL BACK ONCE THOSE ARE ORDERED,

## 2023-07-25 ENCOUNTER — LAB (OUTPATIENT)
Dept: LAB | Facility: HOSPITAL | Age: 88
End: 2023-07-25
Payer: MEDICARE

## 2023-07-25 DIAGNOSIS — I10 PRIMARY HYPERTENSION: ICD-10-CM

## 2023-07-25 DIAGNOSIS — E55.9 VITAMIN D DEFICIENCY: ICD-10-CM

## 2023-07-25 DIAGNOSIS — R73.03 PREDIABETES: ICD-10-CM

## 2023-07-25 DIAGNOSIS — F32.9 REACTIVE DEPRESSION: ICD-10-CM

## 2023-07-25 LAB
25(OH)D3 SERPL-MCNC: 34.8 NG/ML (ref 30–100)
ALBUMIN SERPL-MCNC: 4.5 G/DL (ref 3.5–5.2)
ALBUMIN/GLOB SERPL: 1.7 G/DL
ALP SERPL-CCNC: 99 U/L (ref 39–117)
ALT SERPL W P-5'-P-CCNC: 13 U/L (ref 1–33)
ANION GAP SERPL CALCULATED.3IONS-SCNC: 10.5 MMOL/L (ref 5–15)
AST SERPL-CCNC: 13 U/L (ref 1–32)
BILIRUB SERPL-MCNC: 0.3 MG/DL (ref 0–1.2)
BUN SERPL-MCNC: 18 MG/DL (ref 8–23)
BUN/CREAT SERPL: 23.7 (ref 7–25)
CALCIUM SPEC-SCNC: 9.8 MG/DL (ref 8.6–10.5)
CHLORIDE SERPL-SCNC: 105 MMOL/L (ref 98–107)
CO2 SERPL-SCNC: 25.5 MMOL/L (ref 22–29)
CREAT SERPL-MCNC: 0.76 MG/DL (ref 0.57–1)
EGFRCR SERPLBLD CKD-EPI 2021: 76.9 ML/MIN/1.73
FOLATE SERPL-MCNC: 13.6 NG/ML (ref 4.78–24.2)
GLOBULIN UR ELPH-MCNC: 2.6 GM/DL
GLUCOSE SERPL-MCNC: 90 MG/DL (ref 65–99)
HBA1C MFR BLD: 6 % (ref 4.8–5.6)
MAGNESIUM SERPL-MCNC: 2.2 MG/DL (ref 1.6–2.4)
POTASSIUM SERPL-SCNC: 4 MMOL/L (ref 3.5–5.2)
PROT SERPL-MCNC: 7.1 G/DL (ref 6–8.5)
SODIUM SERPL-SCNC: 141 MMOL/L (ref 136–145)
T3FREE SERPL-MCNC: 3.1 PG/ML (ref 2–4.4)
T4 FREE SERPL-MCNC: 1.61 NG/DL (ref 0.93–1.7)
TSH SERPL DL<=0.05 MIU/L-ACNC: 3.22 UIU/ML (ref 0.27–4.2)
VIT B12 BLD-MCNC: 1211 PG/ML (ref 211–946)

## 2023-07-25 PROCEDURE — 80053 COMPREHEN METABOLIC PANEL: CPT

## 2023-07-25 PROCEDURE — 83036 HEMOGLOBIN GLYCOSYLATED A1C: CPT

## 2023-07-25 PROCEDURE — 82607 VITAMIN B-12: CPT

## 2023-07-25 PROCEDURE — 82746 ASSAY OF FOLIC ACID SERUM: CPT

## 2023-07-25 PROCEDURE — 84443 ASSAY THYROID STIM HORMONE: CPT

## 2023-07-25 PROCEDURE — 82306 VITAMIN D 25 HYDROXY: CPT

## 2023-07-25 PROCEDURE — 84481 FREE ASSAY (FT-3): CPT

## 2023-07-25 PROCEDURE — 36415 COLL VENOUS BLD VENIPUNCTURE: CPT

## 2023-07-25 PROCEDURE — 84439 ASSAY OF FREE THYROXINE: CPT

## 2023-07-25 PROCEDURE — 83735 ASSAY OF MAGNESIUM: CPT

## 2023-07-26 ENCOUNTER — OFFICE VISIT (OUTPATIENT)
Dept: INTERNAL MEDICINE | Facility: CLINIC | Age: 88
End: 2023-07-26
Payer: MEDICARE

## 2023-07-26 VITALS
SYSTOLIC BLOOD PRESSURE: 122 MMHG | TEMPERATURE: 98.4 F | HEIGHT: 60 IN | OXYGEN SATURATION: 99 % | BODY MASS INDEX: 25.52 KG/M2 | DIASTOLIC BLOOD PRESSURE: 68 MMHG | WEIGHT: 130 LBS | HEART RATE: 83 BPM

## 2023-07-26 DIAGNOSIS — E78.00 PURE HYPERCHOLESTEROLEMIA: ICD-10-CM

## 2023-07-26 DIAGNOSIS — I10 PRIMARY HYPERTENSION: ICD-10-CM

## 2023-07-26 DIAGNOSIS — F32.9 REACTIVE DEPRESSION: Primary | ICD-10-CM

## 2023-07-26 DIAGNOSIS — R32 URINARY INCONTINENCE, UNSPECIFIED TYPE: ICD-10-CM

## 2023-07-26 DIAGNOSIS — Z23 NEED FOR PNEUMOCOCCAL 20-VALENT CONJUGATE VACCINATION: ICD-10-CM

## 2023-07-26 DIAGNOSIS — E06.3 HYPOTHYROIDISM DUE TO HASHIMOTO'S THYROIDITIS: ICD-10-CM

## 2023-07-26 DIAGNOSIS — E53.8 VITAMIN B12 DEFICIENCY: ICD-10-CM

## 2023-07-26 DIAGNOSIS — E03.8 HYPOTHYROIDISM DUE TO HASHIMOTO'S THYROIDITIS: ICD-10-CM

## 2023-07-26 DIAGNOSIS — G31.84 MILD COGNITIVE IMPAIRMENT: ICD-10-CM

## 2023-07-26 DIAGNOSIS — R73.03 PREDIABETES: ICD-10-CM

## 2023-07-26 DIAGNOSIS — E55.9 VITAMIN D DEFICIENCY: ICD-10-CM

## 2023-07-26 PROBLEM — E11.65 TYPE 2 DIABETES MELLITUS WITH HYPERGLYCEMIA, WITHOUT LONG-TERM CURRENT USE OF INSULIN: Status: RESOLVED | Noted: 2022-04-11 | Resolved: 2023-07-26

## 2023-07-26 PROCEDURE — 3074F SYST BP LT 130 MM HG: CPT | Performed by: INTERNAL MEDICINE

## 2023-07-26 PROCEDURE — 1160F RVW MEDS BY RX/DR IN RCRD: CPT | Performed by: INTERNAL MEDICINE

## 2023-07-26 PROCEDURE — 3078F DIAST BP <80 MM HG: CPT | Performed by: INTERNAL MEDICINE

## 2023-07-26 PROCEDURE — 1159F MED LIST DOCD IN RCRD: CPT | Performed by: INTERNAL MEDICINE

## 2023-07-26 PROCEDURE — 99214 OFFICE O/P EST MOD 30 MIN: CPT | Performed by: INTERNAL MEDICINE

## 2023-07-26 RX ORDER — LEVOTHYROXINE SODIUM 0.07 MG/1
75 TABLET ORAL DAILY
Qty: 90 TABLET | Refills: 1 | Status: SHIPPED | OUTPATIENT
Start: 2023-07-26

## 2023-07-26 RX ORDER — TELMISARTAN 20 MG/1
20 TABLET ORAL DAILY
Qty: 90 TABLET | Refills: 3 | Status: SHIPPED | OUTPATIENT
Start: 2023-07-26

## 2023-07-26 RX ORDER — ATORVASTATIN CALCIUM 10 MG/1
10 TABLET, FILM COATED ORAL DAILY
Qty: 90 TABLET | Refills: 3 | Status: SHIPPED | OUTPATIENT
Start: 2023-07-26

## 2023-07-26 RX ORDER — DONEPEZIL HYDROCHLORIDE 5 MG/1
TABLET, FILM COATED ORAL
Qty: 30 TABLET | Refills: 2 | Status: SHIPPED | OUTPATIENT
Start: 2023-07-26

## 2023-07-26 RX ORDER — MULTIVIT-MIN/IRON/FOLIC ACID/K 18-600-40
1 CAPSULE ORAL DAILY
Qty: 90 CAPSULE | Refills: 3 | Status: SHIPPED | OUTPATIENT
Start: 2023-07-26

## 2023-07-26 RX ORDER — TOLTERODINE 4 MG/1
4 CAPSULE, EXTENDED RELEASE ORAL DAILY
Qty: 90 CAPSULE | Refills: 3 | Status: SHIPPED | OUTPATIENT
Start: 2023-07-26

## 2023-07-26 RX ORDER — AMLODIPINE BESYLATE 5 MG/1
5 TABLET ORAL DAILY
Qty: 90 TABLET | Refills: 3 | Status: SHIPPED | OUTPATIENT
Start: 2023-07-26

## 2023-07-26 NOTE — PROGRESS NOTES
CHIEF COMPLAINT  Saba Galarza presents to [facility] for follow-up of Hypertension.    HPI    87-year-old female here for 3-month checkup of her chronic problems i.e. hypertension depression, cholesterol GERD prediabetes and review of labs    3/14/23 visit  here for 3-month checkup.  prefers Benadryl instead of Zyrtec.     Past medical history with history of dysthymia/depression-not seeing  Dr. Howard anymore ,osteoarthritis of the left hip, both knees, hypertension, hyperlipidemia, GERD, pre diabetes, chronic kidney disease, anemia, DDD of the L-spine, and stroke.     Dep-hearing voices-chronic -not new -thinks it is her neighbor--no SI no HI--declined  consult again       Current Outpatient Medications:     amLODIPine (NORVASC) 5 MG tablet, Take 1 tablet by mouth Daily., Disp: 90 tablet, Rfl: 3    atorvastatin (LIPITOR) 10 MG tablet, Take 1 tablet by mouth Daily., Disp: 90 tablet, Rfl: 3    calcium carbonate-cholecalciferol 500-400 MG-UNIT tablet tablet, Take  by mouth Daily., Disp: , Rfl:     cetirizine (zyrTEC) 10 MG tablet, Take 1 tablet by mouth Daily., Disp: 90 tablet, Rfl: 1    Diclofenac Sodium (VOLTAREN) 1 % gel gel, Apply 4 g topically to the appropriate area as directed 4 (Four) Times a Day As Needed (350)., Disp: 350 g, Rfl: 2    Glucosamine 500 MG capsule, Take  by mouth., Disp: , Rfl:     levothyroxine (Synthroid) 75 MCG tablet, Take 1 tablet by mouth Daily., Disp: 90 tablet, Rfl: 1    methocarbamol (ROBAXIN) 500 MG tablet, Take 1 tablet by mouth 4 (Four) Times a Day., Disp: , Rfl:     multivitamin with minerals tablet tablet, Take 1 tablet by mouth Daily., Disp: , Rfl:     potassium chloride (K-DUR,KLOR-CON) 20 MEQ CR tablet, Take 1 tablet by mouth Daily., Disp: 90 tablet, Rfl: 1    telmisartan (MICARDIS) 20 MG tablet, Take 1 tablet by mouth Daily., Disp: 90 tablet, Rfl: 3    tolterodine LA (DETROL LA) 4 MG 24 hr capsule, Take 1 capsule by mouth Daily., Disp: 90  "capsule, Rfl: 3    Vitamin D, Cholecalciferol, 50 MCG (2000 UT) capsule, Take 1 capsule by mouth Daily., Disp: 90 capsule, Rfl: 3    donepezil (Aricept) 5 MG tablet, One tab po daily, Disp: 30 tablet, Rfl: 2   PFSH reviewed.      OBJECTIVE  Vital Signs  Vitals:    07/26/23 1433   BP: 122/68   BP Location: Left arm   Patient Position: Sitting   Cuff Size: Adult   Pulse: 83   Temp: 98.4 °F (36.9 °C)   TempSrc: Tympanic   SpO2: 99%   Weight: 59 kg (130 lb)   Height: 152.4 cm (60\")      Body mass index is 25.39 kg/m².    Physical Exam  Vitals and nursing note reviewed.   Constitutional:       Appearance: Normal appearance.   HENT:      Head: Normocephalic and atraumatic.   Cardiovascular:      Rate and Rhythm: Normal rate and regular rhythm.   Pulmonary:      Effort: Pulmonary effort is normal.      Breath sounds: Normal breath sounds.   Abdominal:      Palpations: Abdomen is soft.   Musculoskeletal:      Cervical back: Normal range of motion and neck supple.   Neurological:      General: No focal deficit present.      Mental Status: She is alert and oriented to person, place, and time.      Abnormal clock drawing  2/3 word recall-  RESULTS REVIEW  No results found for: PROBNP, BNP  CMP          9/9/2022    15:31 3/13/2023    12:56 7/25/2023    13:59   CMP   Glucose 75  103  90    BUN 18  20  18    Creatinine 0.93  0.80  0.76    EGFR 60.7  72.3  76.9    Sodium 139  140  141    Potassium 3.8  4.1  4.0    Chloride 102  107  105    Calcium 9.9  9.7  9.8    Total Protein 7.1  7.3  7.1    Albumin 4.50  4.4  4.5    Globulin 2.6  2.9  2.6    Total Bilirubin 0.4  0.2  0.3    Alkaline Phosphatase 84  98  99    AST (SGOT) 17  17  13    ALT (SGPT) 12  17  13    Albumin/Globulin Ratio 1.7  1.5  1.7    BUN/Creatinine Ratio 19.4  25.0  23.7    Anion Gap 13.0  9.0  10.5      CBC w/diff          9/9/2022    15:31 3/13/2023    12:56   CBC w/Diff   WBC 8.28  7.34    RBC 4.78  4.75    Hemoglobin 14.5  14.4    Hematocrit 41.9  42.2    MCV " 87.7  88.8    MCH 30.3  30.3    MCHC 34.6  34.1    RDW 12.5  12.6    Platelets 297  309    Neutrophil Rel % 52.4  49.6    Immature Granulocyte Rel % 0.4  0.1    Lymphocyte Rel % 32.1  33.5    Monocyte Rel % 9.5  10.4    Eosinophil Rel % 4.3  5.0    Basophil Rel % 1.3  1.4       Lipid Panel          9/9/2022    15:31 3/13/2023    12:56   Lipid Panel   Total Cholesterol 113  109    Triglycerides 71  97    HDL Cholesterol 42  41    VLDL Cholesterol 15  19    LDL Cholesterol  56  49    LDL/HDL Ratio 1.35  1.19       Lab Results   Component Value Date    TSH 3.220 07/25/2023    TSH 3.130 03/13/2023    TSH 1.190 09/09/2022      Lab Results   Component Value Date    FREET4 1.61 07/25/2023    FREET4 1.24 03/13/2023    FREET4 1.00 12/14/2022      A1C Last 3 Results          3/13/2023    12:56 7/25/2023    13:59   HGBA1C Last 3 Results   Hemoglobin A1C 6.20  6.00       Lab Results   Component Value Date    [id number] 1,211 (H) 07/25/2023    TQMH31OD 34.8 07/25/2023    MG 2.2 07/25/2023        No Images in the past 120 days found..             ASSESSMENT & PLAN  Diagnoses and all orders for this visit:    1. Reactive depression (Primary)  Comments:  off lexapro for months-denies SI or HI-feels can focus and has interest in things-denies hearing things not there-  Assessment & Plan:  Stable--no HI no SI--afraid of it making her too sleepy-cont lexapro 10 mg one daily-declined  appt--tried in another visit andpt did not give out her birthdate-was paranoid  Denies BH/psych consult    Orders:  -     DEXA Bone Density Axial; Future  -     amLODIPine (NORVASC) 5 MG tablet; Take 1 tablet by mouth Daily.  Dispense: 90 tablet; Refill: 3  -     atorvastatin (LIPITOR) 10 MG tablet; Take 1 tablet by mouth Daily.  Dispense: 90 tablet; Refill: 3  -     levothyroxine (Synthroid) 75 MCG tablet; Take 1 tablet by mouth Daily.  Dispense: 90 tablet; Refill: 1  -     telmisartan (MICARDIS) 20 MG tablet; Take 1 tablet by mouth Daily.  Dispense:  90 tablet; Refill: 3  -     Vitamin D, Cholecalciferol, 50 MCG (2000 UT) capsule; Take 1 capsule by mouth Daily.  Dispense: 90 capsule; Refill: 3  -     tolterodine LA (DETROL LA) 4 MG 24 hr capsule; Take 1 capsule by mouth Daily.  Dispense: 90 capsule; Refill: 3  -     Comprehensive Metabolic Panel; Future  -     TSH+Free T4; Future  -     T3, Free; Future  -     Vitamin B12; Future  -     Folate; Future  -     Magnesium; Future  -     Vitamin D,25-Hydroxy; Future    2. Primary hypertension  Assessment & Plan:  Blood pressure is stable goal is less than 122/68continue amlodipine 5 mg 1 daily, telmisartan 20 mg 1 daily, and atorvastatin 10 mg daily for cardiovascular disease protection.    Orders:  -     DEXA Bone Density Axial; Future  -     amLODIPine (NORVASC) 5 MG tablet; Take 1 tablet by mouth Daily.  Dispense: 90 tablet; Refill: 3  -     atorvastatin (LIPITOR) 10 MG tablet; Take 1 tablet by mouth Daily.  Dispense: 90 tablet; Refill: 3  -     levothyroxine (Synthroid) 75 MCG tablet; Take 1 tablet by mouth Daily.  Dispense: 90 tablet; Refill: 1  -     telmisartan (MICARDIS) 20 MG tablet; Take 1 tablet by mouth Daily.  Dispense: 90 tablet; Refill: 3  -     Vitamin D, Cholecalciferol, 50 MCG (2000 UT) capsule; Take 1 capsule by mouth Daily.  Dispense: 90 capsule; Refill: 3  -     tolterodine LA (DETROL LA) 4 MG 24 hr capsule; Take 1 capsule by mouth Daily.  Dispense: 90 capsule; Refill: 3  -     Comprehensive Metabolic Panel; Future  -     TSH+Free T4; Future  -     T3, Free; Future  -     Vitamin B12; Future  -     Folate; Future  -     Magnesium; Future  -     Vitamin D,25-Hydroxy; Future    3. Pure hypercholesterolemia  Assessment & Plan:  Lipid abnormalities are stable-no myalgias-    Plan -cont with atorvastatin 10 mg daily    Orders:  -     DEXA Bone Density Axial; Future  -     amLODIPine (NORVASC) 5 MG tablet; Take 1 tablet by mouth Daily.  Dispense: 90 tablet; Refill: 3  -     atorvastatin (LIPITOR) 10 MG  tablet; Take 1 tablet by mouth Daily.  Dispense: 90 tablet; Refill: 3  -     levothyroxine (Synthroid) 75 MCG tablet; Take 1 tablet by mouth Daily.  Dispense: 90 tablet; Refill: 1  -     telmisartan (MICARDIS) 20 MG tablet; Take 1 tablet by mouth Daily.  Dispense: 90 tablet; Refill: 3  -     Vitamin D, Cholecalciferol, 50 MCG (2000 UT) capsule; Take 1 capsule by mouth Daily.  Dispense: 90 capsule; Refill: 3  -     tolterodine LA (DETROL LA) 4 MG 24 hr capsule; Take 1 capsule by mouth Daily.  Dispense: 90 capsule; Refill: 3  -     Comprehensive Metabolic Panel; Future  -     TSH+Free T4; Future  -     T3, Free; Future  -     Vitamin B12; Future  -     Folate; Future  -     Magnesium; Future  -     Vitamin D,25-Hydroxy; Future    4. Prediabetes  Comments:  V7t=4--ipeb low carb diet  Orders:  -     DEXA Bone Density Axial; Future  -     amLODIPine (NORVASC) 5 MG tablet; Take 1 tablet by mouth Daily.  Dispense: 90 tablet; Refill: 3  -     atorvastatin (LIPITOR) 10 MG tablet; Take 1 tablet by mouth Daily.  Dispense: 90 tablet; Refill: 3  -     levothyroxine (Synthroid) 75 MCG tablet; Take 1 tablet by mouth Daily.  Dispense: 90 tablet; Refill: 1  -     telmisartan (MICARDIS) 20 MG tablet; Take 1 tablet by mouth Daily.  Dispense: 90 tablet; Refill: 3  -     Vitamin D, Cholecalciferol, 50 MCG (2000 UT) capsule; Take 1 capsule by mouth Daily.  Dispense: 90 capsule; Refill: 3  -     tolterodine LA (DETROL LA) 4 MG 24 hr capsule; Take 1 capsule by mouth Daily.  Dispense: 90 capsule; Refill: 3  -     Comprehensive Metabolic Panel; Future  -     TSH+Free T4; Future  -     T3, Free; Future  -     Vitamin B12; Future  -     Folate; Future  -     Magnesium; Future  -     Vitamin D,25-Hydroxy; Future    5. Need for pneumococcal 20-valent conjugate vaccination  -     DEXA Bone Density Axial; Future  -     amLODIPine (NORVASC) 5 MG tablet; Take 1 tablet by mouth Daily.  Dispense: 90 tablet; Refill: 3  -     atorvastatin (LIPITOR) 10  MG tablet; Take 1 tablet by mouth Daily.  Dispense: 90 tablet; Refill: 3  -     levothyroxine (Synthroid) 75 MCG tablet; Take 1 tablet by mouth Daily.  Dispense: 90 tablet; Refill: 1  -     telmisartan (MICARDIS) 20 MG tablet; Take 1 tablet by mouth Daily.  Dispense: 90 tablet; Refill: 3  -     Vitamin D, Cholecalciferol, 50 MCG (2000 UT) capsule; Take 1 capsule by mouth Daily.  Dispense: 90 capsule; Refill: 3  -     tolterodine LA (DETROL LA) 4 MG 24 hr capsule; Take 1 capsule by mouth Daily.  Dispense: 90 capsule; Refill: 3  -     Comprehensive Metabolic Panel; Future  -     TSH+Free T4; Future  -     T3, Free; Future  -     Vitamin B12; Future  -     Folate; Future  -     Magnesium; Future  -     Vitamin D,25-Hydroxy; Future    6. Vitamin B12 deficiency  Comments:  B12>1200 can decrease to 5d/week not daily  Orders:  -     DEXA Bone Density Axial; Future  -     amLODIPine (NORVASC) 5 MG tablet; Take 1 tablet by mouth Daily.  Dispense: 90 tablet; Refill: 3  -     atorvastatin (LIPITOR) 10 MG tablet; Take 1 tablet by mouth Daily.  Dispense: 90 tablet; Refill: 3  -     levothyroxine (Synthroid) 75 MCG tablet; Take 1 tablet by mouth Daily.  Dispense: 90 tablet; Refill: 1  -     telmisartan (MICARDIS) 20 MG tablet; Take 1 tablet by mouth Daily.  Dispense: 90 tablet; Refill: 3  -     Vitamin D, Cholecalciferol, 50 MCG (2000 UT) capsule; Take 1 capsule by mouth Daily.  Dispense: 90 capsule; Refill: 3  -     tolterodine LA (DETROL LA) 4 MG 24 hr capsule; Take 1 capsule by mouth Daily.  Dispense: 90 capsule; Refill: 3  -     Comprehensive Metabolic Panel; Future  -     TSH+Free T4; Future  -     T3, Free; Future  -     Vitamin B12; Future  -     Folate; Future  -     Magnesium; Future  -     Vitamin D,25-Hydroxy; Future    7. Vitamin D deficiency  Comments:  Vit D =mid 30s--increase vit D to 2000 u daily  Orders:  -     DEXA Bone Density Axial; Future  -     amLODIPine (NORVASC) 5 MG tablet; Take 1 tablet by mouth Daily.   Dispense: 90 tablet; Refill: 3  -     atorvastatin (LIPITOR) 10 MG tablet; Take 1 tablet by mouth Daily.  Dispense: 90 tablet; Refill: 3  -     levothyroxine (Synthroid) 75 MCG tablet; Take 1 tablet by mouth Daily.  Dispense: 90 tablet; Refill: 1  -     telmisartan (MICARDIS) 20 MG tablet; Take 1 tablet by mouth Daily.  Dispense: 90 tablet; Refill: 3  -     Vitamin D, Cholecalciferol, 50 MCG (2000 UT) capsule; Take 1 capsule by mouth Daily.  Dispense: 90 capsule; Refill: 3  -     tolterodine LA (DETROL LA) 4 MG 24 hr capsule; Take 1 capsule by mouth Daily.  Dispense: 90 capsule; Refill: 3  -     Comprehensive Metabolic Panel; Future  -     TSH+Free T4; Future  -     T3, Free; Future  -     Vitamin B12; Future  -     Folate; Future  -     Magnesium; Future  -     Vitamin D,25-Hydroxy; Future    8. Hypothyroidism due to Hashimoto's thyroiditis  Assessment & Plan:  Stable thyroid function tests.  Refill Synthroid 75 mcg once a day brand-name.    Orders:  -     DEXA Bone Density Axial; Future  -     amLODIPine (NORVASC) 5 MG tablet; Take 1 tablet by mouth Daily.  Dispense: 90 tablet; Refill: 3  -     atorvastatin (LIPITOR) 10 MG tablet; Take 1 tablet by mouth Daily.  Dispense: 90 tablet; Refill: 3  -     levothyroxine (Synthroid) 75 MCG tablet; Take 1 tablet by mouth Daily.  Dispense: 90 tablet; Refill: 1  -     telmisartan (MICARDIS) 20 MG tablet; Take 1 tablet by mouth Daily.  Dispense: 90 tablet; Refill: 3  -     Vitamin D, Cholecalciferol, 50 MCG (2000 UT) capsule; Take 1 capsule by mouth Daily.  Dispense: 90 capsule; Refill: 3  -     tolterodine LA (DETROL LA) 4 MG 24 hr capsule; Take 1 capsule by mouth Daily.  Dispense: 90 capsule; Refill: 3  -     Comprehensive Metabolic Panel; Future  -     TSH+Free T4; Future  -     T3, Free; Future  -     Vitamin B12; Future  -     Folate; Future  -     Magnesium; Future  -     Vitamin D,25-Hydroxy; Future    9. Urinary incontinence, unspecified type  -     DEXA Bone Density  Axial; Future  -     amLODIPine (NORVASC) 5 MG tablet; Take 1 tablet by mouth Daily.  Dispense: 90 tablet; Refill: 3  -     atorvastatin (LIPITOR) 10 MG tablet; Take 1 tablet by mouth Daily.  Dispense: 90 tablet; Refill: 3  -     levothyroxine (Synthroid) 75 MCG tablet; Take 1 tablet by mouth Daily.  Dispense: 90 tablet; Refill: 1  -     telmisartan (MICARDIS) 20 MG tablet; Take 1 tablet by mouth Daily.  Dispense: 90 tablet; Refill: 3  -     Vitamin D, Cholecalciferol, 50 MCG (2000 UT) capsule; Take 1 capsule by mouth Daily.  Dispense: 90 capsule; Refill: 3  -     tolterodine LA (DETROL LA) 4 MG 24 hr capsule; Take 1 capsule by mouth Daily.  Dispense: 90 capsule; Refill: 3  -     Comprehensive Metabolic Panel; Future  -     TSH+Free T4; Future  -     T3, Free; Future  -     Vitamin B12; Future  -     Folate; Future  -     Magnesium; Future  -     Vitamin D,25-Hydroxy; Future    10. Mild cognitive impairment  Comments:  abn clock drawing  2/3 word recall--declined  consult-offerred aricept-SE-insomnia /spmnolence, N/V/D-declined CT head  Orders:  -     donepezil (Aricept) 5 MG tablet; One tab po daily  Dispense: 30 tablet; Refill: 2                  FOLLOW UP  Return in about 2 months (around 9/26/2023) for Recheck.    Patient was given instructions and counseling regarding her condition or for health maintenance advice. Please see specific information pulled into the AVS if appropriate.

## 2023-07-26 NOTE — ASSESSMENT & PLAN NOTE
Blood pressure is stable goal is less than 122/68continue amlodipine 5 mg 1 daily, telmisartan 20 mg 1 daily, and atorvastatin 10 mg daily for cardiovascular disease protection.

## 2023-07-27 ENCOUNTER — TELEPHONE (OUTPATIENT)
Dept: INTERNAL MEDICINE | Facility: CLINIC | Age: 88
End: 2023-07-27
Payer: MEDICARE

## 2023-07-31 ENCOUNTER — TELEPHONE (OUTPATIENT)
Dept: INTERNAL MEDICINE | Facility: CLINIC | Age: 88
End: 2023-07-31
Payer: MEDICARE

## 2023-08-09 ENCOUNTER — OFFICE VISIT (OUTPATIENT)
Dept: INTERNAL MEDICINE | Facility: CLINIC | Age: 88
End: 2023-08-09
Payer: MEDICARE

## 2023-08-09 VITALS
BODY MASS INDEX: 26.03 KG/M2 | DIASTOLIC BLOOD PRESSURE: 80 MMHG | HEIGHT: 60 IN | WEIGHT: 132.6 LBS | SYSTOLIC BLOOD PRESSURE: 140 MMHG

## 2023-08-09 DIAGNOSIS — E55.9 VITAMIN D DEFICIENCY: ICD-10-CM

## 2023-08-09 DIAGNOSIS — E11.65 TYPE 2 DIABETES MELLITUS WITH HYPERGLYCEMIA, WITHOUT LONG-TERM CURRENT USE OF INSULIN: ICD-10-CM

## 2023-08-09 DIAGNOSIS — I10 PRIMARY HYPERTENSION: ICD-10-CM

## 2023-08-09 DIAGNOSIS — F32.9 REACTIVE DEPRESSION: Primary | ICD-10-CM

## 2023-08-09 PROCEDURE — 99214 OFFICE O/P EST MOD 30 MIN: CPT | Performed by: INTERNAL MEDICINE

## 2023-08-09 PROCEDURE — 1160F RVW MEDS BY RX/DR IN RCRD: CPT | Performed by: INTERNAL MEDICINE

## 2023-08-09 PROCEDURE — 1159F MED LIST DOCD IN RCRD: CPT | Performed by: INTERNAL MEDICINE

## 2023-08-09 RX ORDER — ESCITALOPRAM OXALATE 10 MG/1
10 TABLET ORAL DAILY
Qty: 90 TABLET | Refills: 1 | Status: SHIPPED | OUTPATIENT
Start: 2023-08-09

## 2023-08-09 RX ORDER — ESCITALOPRAM OXALATE 10 MG/1
10 TABLET ORAL DAILY
COMMUNITY
End: 2023-08-09 | Stop reason: SDUPTHER

## 2023-08-09 NOTE — ASSESSMENT & PLAN NOTE
Blood pressure is stable   Plan-continue amlodipine 5 mg 1 daily, telmisartan 20 mg 1 daily, and atorvastatin 10 mg daily for cardiovascular disease protection.

## 2023-08-09 NOTE — ASSESSMENT & PLAN NOTE
Stable--no HI no SI--afraid of it making her too sleepy-cont lexapro 10 mg one daily-declined  appt-again -denies any hallucinations

## 2023-08-09 NOTE — ASSESSMENT & PLAN NOTE
Diabetes is stable-not checking her BS since does not like needles    Plan-cont lifestyle changes-follow low carb diet

## 2023-08-09 NOTE — PROGRESS NOTES
CHIEF COMPLAINT  Saba Galarza presents to [facility] for follow-up of Med Refill (Patient says she tried escitalopram and said she thought it put her in a downward spirilal and she tried it again and says she is doing better on it and patient was wanting to know if it is adicitive.  If she should continue to take it and if it would intifer with the aricept).    HPI    87-year-old female here for follow-up from last visit-July 26, 2023.    Past medical history with history of dysthymia/depression-not seeing  Dr. Howard anymore ,osteoarthritis of the left hip, both knees, hypertension, hyperlipidemia, GERD, pre diabetes, chronic kidney disease, anemia, DDD of the L-spine, and stroke.  H/o schizophrenia-denies hallucinations at present-declined  referral     Dep-not hearing voices-now per pt chronic --feeling better-was able to work on her lawn--no SI no HI--declined  consult again    SH-lives alone-has 3 kids -youngest in [facility], a daughter Marce, and son in [place]-pt drives herself    Records reviewed, meds reviewed in detail, labs reviewed with patient.  Plan of care is as follows below.    Current Outpatient Medications:     amLODIPine (NORVASC) 5 MG tablet, Take 1 tablet by mouth Daily., Disp: 90 tablet, Rfl: 3    atorvastatin (LIPITOR) 10 MG tablet, Take 1 tablet by mouth Daily., Disp: 90 tablet, Rfl: 3    calcium carbonate-cholecalciferol 500-400 MG-UNIT tablet tablet, Take  by mouth Daily., Disp: , Rfl:     cetirizine (zyrTEC) 10 MG tablet, Take 1 tablet by mouth Daily., Disp: 90 tablet, Rfl: 1    Diclofenac Sodium (VOLTAREN) 1 % gel gel, Apply 4 g topically to the appropriate area as directed 4 (Four) Times a Day As Needed (350)., Disp: 350 g, Rfl: 2    donepezil (Aricept) 5 MG tablet, One tab po daily, Disp: 30 tablet, Rfl: 2    escitalopram (LEXAPRO) 10 MG tablet, Take 1 tablet by mouth Daily., Disp: 90 tablet, Rfl: 1    Glucosamine 500 MG capsule, Take  by mouth.,  "Disp: , Rfl:     levothyroxine (Synthroid) 75 MCG tablet, Take 1 tablet by mouth Daily., Disp: 90 tablet, Rfl: 1    methocarbamol (ROBAXIN) 500 MG tablet, Take 1 tablet by mouth 4 (Four) Times a Day., Disp: , Rfl:     multivitamin with minerals tablet tablet, Take 1 tablet by mouth Daily., Disp: , Rfl:     potassium chloride (K-DUR,KLOR-CON) 20 MEQ CR tablet, Take 1 tablet by mouth Daily., Disp: 90 tablet, Rfl: 1    telmisartan (MICARDIS) 20 MG tablet, Take 1 tablet by mouth Daily., Disp: 90 tablet, Rfl: 3    tolterodine LA (DETROL LA) 4 MG 24 hr capsule, Take 1 capsule by mouth Daily., Disp: 90 capsule, Rfl: 3    Vitamin D, Cholecalciferol, 50 MCG (2000 UT) capsule, Take 1 capsule by mouth Daily., Disp: 90 capsule, Rfl: 3   PFSH reviewed.      OBJECTIVE  Vital Signs  Vitals:    08/09/23 1450 08/09/23 1547   BP:  140/80   BP Location:  Left arm   Patient Position:  Sitting   Cuff Size:  Adult   Weight: 60.1 kg (132 lb 9.6 oz)    Height: 152.4 cm (60\")       Body mass index is 25.9 kg/mý.    Physical Exam  Vitals and nursing note reviewed.   Constitutional:       Appearance: Normal appearance.   HENT:      Head: Normocephalic and atraumatic.   Cardiovascular:      Rate and Rhythm: Normal rate and regular rhythm.   Pulmonary:      Effort: Pulmonary effort is normal.      Breath sounds: Normal breath sounds.   Abdominal:      Palpations: Abdomen is soft.   Musculoskeletal:      Cervical back: Normal range of motion and neck supple.   Neurological:      General: No focal deficit present.      Mental Status: She is alert and oriented to person, place, and time.        RESULTS REVIEW  No results found for: PROBNP, BNP  CMP          9/9/2022    15:31 3/13/2023    12:56 7/25/2023    13:59   CMP   Glucose 75  103  90    BUN 18  20  18    Creatinine 0.93  0.80  0.76    EGFR 60.7  72.3  76.9    Sodium 139  140  141    Potassium 3.8  4.1  4.0    Chloride 102  107  105    Calcium 9.9  9.7  9.8    Total Protein 7.1  7.3  7.1  "   Albumin 4.50  4.4  4.5    Globulin 2.6  2.9  2.6    Total Bilirubin 0.4  0.2  0.3    Alkaline Phosphatase 84  98  99    AST (SGOT) 17  17  13    ALT (SGPT) 12  17  13    Albumin/Globulin Ratio 1.7  1.5  1.7    BUN/Creatinine Ratio 19.4  25.0  23.7    Anion Gap 13.0  9.0  10.5      CBC w/diff          9/9/2022    15:31 3/13/2023    12:56   CBC w/Diff   WBC 8.28  7.34    RBC 4.78  4.75    Hemoglobin 14.5  14.4    Hematocrit 41.9  42.2    MCV 87.7  88.8    MCH 30.3  30.3    MCHC 34.6  34.1    RDW 12.5  12.6    Platelets 297  309    Neutrophil Rel % 52.4  49.6    Immature Granulocyte Rel % 0.4  0.1    Lymphocyte Rel % 32.1  33.5    Monocyte Rel % 9.5  10.4    Eosinophil Rel % 4.3  5.0    Basophil Rel % 1.3  1.4       Lipid Panel          9/9/2022    15:31 3/13/2023    12:56   Lipid Panel   Total Cholesterol 113  109    Triglycerides 71  97    HDL Cholesterol 42  41    VLDL Cholesterol 15  19    LDL Cholesterol  56  49    LDL/HDL Ratio 1.35  1.19       Lab Results   Component Value Date    TSH 3.220 07/25/2023    TSH 3.130 03/13/2023    TSH 1.190 09/09/2022      Lab Results   Component Value Date    FREET4 1.61 07/25/2023    FREET4 1.24 03/13/2023    FREET4 1.00 12/14/2022      A1C Last 3 Results          3/13/2023    12:56 7/25/2023    13:59   HGBA1C Last 3 Results   Hemoglobin A1C 6.20  6.00       Lab Results   Component Value Date    [id number] 1,211 (H) 07/25/2023    CJIE13BV 34.8 07/25/2023    MG 2.2 07/25/2023        No Images in the past 120 days found..             ASSESSMENT & PLAN  Diagnoses and all orders for this visit:    1. Reactive depression (Primary)  Comments:  stable with lexapro 10 mg q d  Assessment & Plan:  Stable--no HI no SI--afraid of it making her too sleepy-cont lexapro 10 mg one daily-declined BH appt-again -denies any hallucinations    Orders:  -     escitalopram (LEXAPRO) 10 MG tablet; Take 1 tablet by mouth Daily.  Dispense: 90 tablet; Refill: 1  -     Comprehensive Metabolic Panel;  Future  -     Lipid Panel; Future  -     CBC & Differential; Future  -     Vitamin B12; Future  -     Folate; Future  -     Vitamin D,25-Hydroxy; Future  -     Magnesium; Future  -     Microalbumin / Creatinine Urine Ratio - Urine, Clean Catch; Future  -     Hemoglobin A1c; Future  -     TSH+Free T4; Future  -     T3, Free; Future    2. Type 2 diabetes mellitus with hyperglycemia, without long-term current use of insulin  Comments:  A1c=6  Assessment & Plan:  Diabetes is stable-not checking her BS since does not like needles    Plan-cont lifestyle changes-follow low carb diet    Orders:  -     Comprehensive Metabolic Panel; Future  -     Lipid Panel; Future  -     CBC & Differential; Future  -     Vitamin B12; Future  -     Folate; Future  -     Vitamin D,25-Hydroxy; Future  -     Magnesium; Future  -     Microalbumin / Creatinine Urine Ratio - Urine, Clean Catch; Future  -     Hemoglobin A1c; Future  -     TSH+Free T4; Future  -     T3, Free; Future    3. Primary hypertension  Assessment & Plan:  Blood pressure is stable   Plan-continue amlodipine 5 mg 1 daily, telmisartan 20 mg 1 daily, and atorvastatin 10 mg daily for cardiovascular disease protection.    Orders:  -     Comprehensive Metabolic Panel; Future  -     Lipid Panel; Future  -     CBC & Differential; Future  -     Vitamin B12; Future  -     Folate; Future  -     Vitamin D,25-Hydroxy; Future  -     Magnesium; Future  -     Microalbumin / Creatinine Urine Ratio - Urine, Clean Catch; Future  -     Hemoglobin A1c; Future  -     TSH+Free T4; Future  -     T3, Free; Future    4. Vitamin D deficiency  -     Vitamin D,25-Hydroxy; Future               There are no Patient Instructions on file for this visit.     FOLLOW UP  Return in about 3 months (around 11/9/2023) for Medicare Wellness, Recheck.    Patient was given instructions and counseling regarding her condition or for health maintenance advice. Please see specific information pulled into the AVS if  appropriate.

## 2023-09-13 RX ORDER — METHOCARBAMOL 500 MG/1
500 TABLET, FILM COATED ORAL 3 TIMES DAILY
Qty: 60 TABLET | Refills: 2 | Status: SHIPPED | OUTPATIENT
Start: 2023-09-13

## 2023-09-13 NOTE — TELEPHONE ENCOUNTER
Caller: Saba Galarza    Relationship: Self    Best call back number:     [phone]       Requested Prescriptions:   Requested Prescriptions     Pending Prescriptions Disp Refills    methocarbamol (ROBAXIN) 500 MG tablet       Sig: Take 1 tablet by mouth 4 (Four) Times a Day.        Pharmacy where request should be sent: [id number] - [place], [place] - [address] [facility] [address] [place] [facility] [place] - [phone] [facility] [phone] FX     Last office visit with prescribing clinician: 8/9/2023   Last telemedicine visit with prescribing clinician: Visit date not found   Next office visit with prescribing clinician: 11/9/2023     Additional details provided by patient: COMPLETELY OUT    Does the patient have less than a 3 day supply:  [x] Yes  [] No    Would you like a call back once the refill request has been completed: [] Yes [] No    If the office needs to give you a call back, can they leave a voicemail: [] Yes [] No    Navid Llanes Rep   09/13/23 13:57 EDT

## 2023-10-10 ENCOUNTER — LAB (OUTPATIENT)
Dept: LAB | Facility: HOSPITAL | Age: 88
End: 2023-10-10
Payer: MEDICARE

## 2023-10-10 DIAGNOSIS — E11.65 TYPE 2 DIABETES MELLITUS WITH HYPERGLYCEMIA, WITHOUT LONG-TERM CURRENT USE OF INSULIN: ICD-10-CM

## 2023-10-10 DIAGNOSIS — F32.9 REACTIVE DEPRESSION: ICD-10-CM

## 2023-10-10 DIAGNOSIS — E55.9 VITAMIN D DEFICIENCY: ICD-10-CM

## 2023-10-10 DIAGNOSIS — I10 PRIMARY HYPERTENSION: ICD-10-CM

## 2023-10-10 LAB
25(OH)D3 SERPL-MCNC: 31.8 NG/ML (ref 30–100)
ALBUMIN SERPL-MCNC: 4.6 G/DL (ref 3.5–5.2)
ALBUMIN UR-MCNC: 21.9 MG/DL
ALBUMIN/GLOB SERPL: 1.8 G/DL
ALP SERPL-CCNC: 98 U/L (ref 39–117)
ALT SERPL W P-5'-P-CCNC: 12 U/L (ref 1–33)
ANION GAP SERPL CALCULATED.3IONS-SCNC: 10.3 MMOL/L (ref 5–15)
AST SERPL-CCNC: 16 U/L (ref 1–32)
BASOPHILS # BLD AUTO: 0.08 10*3/MM3 (ref 0–0.2)
BASOPHILS NFR BLD AUTO: 0.7 % (ref 0–1.5)
BILIRUB SERPL-MCNC: 0.3 MG/DL (ref 0–1.2)
BUN SERPL-MCNC: 16 MG/DL (ref 8–23)
BUN/CREAT SERPL: 21.1 (ref 7–25)
CALCIUM SPEC-SCNC: 9.9 MG/DL (ref 8.6–10.5)
CHLORIDE SERPL-SCNC: 103 MMOL/L (ref 98–107)
CHOLEST SERPL-MCNC: 129 MG/DL (ref 0–200)
CO2 SERPL-SCNC: 26.7 MMOL/L (ref 22–29)
CREAT SERPL-MCNC: 0.76 MG/DL (ref 0.57–1)
CREAT UR-MCNC: 89.3 MG/DL
DEPRECATED RDW RBC AUTO: 41.4 FL (ref 37–54)
EGFRCR SERPLBLD CKD-EPI 2021: 76.9 ML/MIN/1.73
EOSINOPHIL # BLD AUTO: 0.37 10*3/MM3 (ref 0–0.4)
EOSINOPHIL NFR BLD AUTO: 3.2 % (ref 0.3–6.2)
ERYTHROCYTE [DISTWIDTH] IN BLOOD BY AUTOMATED COUNT: 12.6 % (ref 12.3–15.4)
FOLATE SERPL-MCNC: 10.1 NG/ML (ref 4.78–24.2)
GLOBULIN UR ELPH-MCNC: 2.5 GM/DL
GLUCOSE SERPL-MCNC: 87 MG/DL (ref 65–99)
HBA1C MFR BLD: 5.9 % (ref 4.8–5.6)
HCT VFR BLD AUTO: 45 % (ref 34–46.6)
HDLC SERPL-MCNC: 39 MG/DL (ref 40–60)
HGB BLD-MCNC: 15.1 G/DL (ref 12–15.9)
IMM GRANULOCYTES # BLD AUTO: 0.03 10*3/MM3 (ref 0–0.05)
IMM GRANULOCYTES NFR BLD AUTO: 0.3 % (ref 0–0.5)
LDLC SERPL CALC-MCNC: 62 MG/DL (ref 0–100)
LDLC/HDLC SERPL: 1.48 {RATIO}
LYMPHOCYTES # BLD AUTO: 2 10*3/MM3 (ref 0.7–3.1)
LYMPHOCYTES NFR BLD AUTO: 17.3 % (ref 19.6–45.3)
MAGNESIUM SERPL-MCNC: 2.2 MG/DL (ref 1.6–2.4)
MCH RBC QN AUTO: 30.2 PG (ref 26.6–33)
MCHC RBC AUTO-ENTMCNC: 33.6 G/DL (ref 31.5–35.7)
MCV RBC AUTO: 90 FL (ref 79–97)
MICROALBUMIN/CREAT UR: 245.2 MG/G (ref 0–29)
MONOCYTES # BLD AUTO: 1.08 10*3/MM3 (ref 0.1–0.9)
MONOCYTES NFR BLD AUTO: 9.3 % (ref 5–12)
NEUTROPHILS NFR BLD AUTO: 69.2 % (ref 42.7–76)
NEUTROPHILS NFR BLD AUTO: 8.01 10*3/MM3 (ref 1.7–7)
NRBC BLD AUTO-RTO: 0 /100 WBC (ref 0–0.2)
PLATELET # BLD AUTO: 308 10*3/MM3 (ref 140–450)
PMV BLD AUTO: 12 FL (ref 6–12)
POTASSIUM SERPL-SCNC: 3.8 MMOL/L (ref 3.5–5.2)
PROT SERPL-MCNC: 7.1 G/DL (ref 6–8.5)
RBC # BLD AUTO: 5 10*6/MM3 (ref 3.77–5.28)
SODIUM SERPL-SCNC: 140 MMOL/L (ref 136–145)
T3FREE SERPL-MCNC: 2.86 PG/ML (ref 2–4.4)
T4 FREE SERPL-MCNC: 1.29 NG/DL (ref 0.93–1.7)
TRIGL SERPL-MCNC: 162 MG/DL (ref 0–150)
TSH SERPL DL<=0.05 MIU/L-ACNC: 5.33 UIU/ML (ref 0.27–4.2)
VIT B12 BLD-MCNC: 672 PG/ML (ref 211–946)
VLDLC SERPL-MCNC: 28 MG/DL (ref 5–40)
WBC NRBC COR # BLD: 11.57 10*3/MM3 (ref 3.4–10.8)

## 2023-10-10 PROCEDURE — 83735 ASSAY OF MAGNESIUM: CPT

## 2023-10-10 PROCEDURE — 82570 ASSAY OF URINE CREATININE: CPT

## 2023-10-10 PROCEDURE — 84443 ASSAY THYROID STIM HORMONE: CPT

## 2023-10-10 PROCEDURE — 82607 VITAMIN B-12: CPT

## 2023-10-10 PROCEDURE — 82746 ASSAY OF FOLIC ACID SERUM: CPT

## 2023-10-10 PROCEDURE — 80061 LIPID PANEL: CPT

## 2023-10-10 PROCEDURE — 84481 FREE ASSAY (FT-3): CPT

## 2023-10-10 PROCEDURE — 82043 UR ALBUMIN QUANTITATIVE: CPT

## 2023-10-10 PROCEDURE — 83036 HEMOGLOBIN GLYCOSYLATED A1C: CPT

## 2023-10-10 PROCEDURE — 80053 COMPREHEN METABOLIC PANEL: CPT

## 2023-10-10 PROCEDURE — 36415 COLL VENOUS BLD VENIPUNCTURE: CPT

## 2023-10-10 PROCEDURE — 84439 ASSAY OF FREE THYROXINE: CPT

## 2023-10-10 PROCEDURE — 85025 COMPLETE CBC W/AUTO DIFF WBC: CPT

## 2023-10-10 PROCEDURE — 82306 VITAMIN D 25 HYDROXY: CPT

## 2023-10-13 ENCOUNTER — TELEPHONE (OUTPATIENT)
Dept: INTERNAL MEDICINE | Facility: CLINIC | Age: 88
End: 2023-10-13
Payer: MEDICARE

## 2023-10-13 NOTE — TELEPHONE ENCOUNTER
Spoke patient about a referral and while on the phone she asked if she could be referred to vascular to have her veins looked at. Is a referral for vascular okay to place?   verbal instruction/written material/individual instruction

## 2024-02-21 DIAGNOSIS — F32.9 REACTIVE DEPRESSION: ICD-10-CM

## 2024-02-21 DIAGNOSIS — I10 PRIMARY HYPERTENSION: ICD-10-CM

## 2024-02-21 DIAGNOSIS — R73.03 PREDIABETES: ICD-10-CM

## 2024-02-21 DIAGNOSIS — E78.00 PURE HYPERCHOLESTEROLEMIA: ICD-10-CM

## 2024-02-21 DIAGNOSIS — Z23 NEED FOR PNEUMOCOCCAL 20-VALENT CONJUGATE VACCINATION: ICD-10-CM

## 2024-02-21 DIAGNOSIS — E06.3 HYPOTHYROIDISM DUE TO HASHIMOTO'S THYROIDITIS: ICD-10-CM

## 2024-02-21 DIAGNOSIS — E03.8 HYPOTHYROIDISM DUE TO HASHIMOTO'S THYROIDITIS: ICD-10-CM

## 2024-02-21 DIAGNOSIS — R32 URINARY INCONTINENCE, UNSPECIFIED TYPE: ICD-10-CM

## 2024-02-21 DIAGNOSIS — E53.8 VITAMIN B12 DEFICIENCY: ICD-10-CM

## 2024-02-21 DIAGNOSIS — E55.9 VITAMIN D DEFICIENCY: ICD-10-CM

## 2024-02-21 RX ORDER — POTASSIUM CHLORIDE 20 MEQ/1
20 TABLET, EXTENDED RELEASE ORAL DAILY
Qty: 90 TABLET | Refills: 1 | Status: SHIPPED | OUTPATIENT
Start: 2024-02-21

## 2024-02-21 RX ORDER — ATORVASTATIN CALCIUM 10 MG/1
10 TABLET, FILM COATED ORAL DAILY
Qty: 90 TABLET | Refills: 3 | Status: SHIPPED | OUTPATIENT
Start: 2024-02-21

## 2024-02-22 DIAGNOSIS — E53.8 VITAMIN B12 DEFICIENCY: ICD-10-CM

## 2024-02-22 DIAGNOSIS — F32.9 REACTIVE DEPRESSION: ICD-10-CM

## 2024-02-22 DIAGNOSIS — E78.00 PURE HYPERCHOLESTEROLEMIA: ICD-10-CM

## 2024-02-22 DIAGNOSIS — E55.9 VITAMIN D DEFICIENCY: ICD-10-CM

## 2024-02-22 DIAGNOSIS — R32 URINARY INCONTINENCE, UNSPECIFIED TYPE: ICD-10-CM

## 2024-02-22 DIAGNOSIS — Z23 NEED FOR PNEUMOCOCCAL 20-VALENT CONJUGATE VACCINATION: ICD-10-CM

## 2024-02-22 DIAGNOSIS — R73.03 PREDIABETES: ICD-10-CM

## 2024-02-22 DIAGNOSIS — E03.8 HYPOTHYROIDISM DUE TO HASHIMOTO'S THYROIDITIS: ICD-10-CM

## 2024-02-22 DIAGNOSIS — E06.3 HYPOTHYROIDISM DUE TO HASHIMOTO'S THYROIDITIS: ICD-10-CM

## 2024-02-22 DIAGNOSIS — I10 PRIMARY HYPERTENSION: ICD-10-CM

## 2024-02-22 RX ORDER — AMLODIPINE BESYLATE 5 MG/1
5 TABLET ORAL DAILY
Qty: 90 TABLET | Refills: 3 | OUTPATIENT
Start: 2024-02-22

## 2024-04-11 ENCOUNTER — TELEPHONE (OUTPATIENT)
Dept: INTERNAL MEDICINE | Age: 89
End: 2024-04-11
Payer: MEDICARE

## 2024-04-11 DIAGNOSIS — F32.9 REACTIVE DEPRESSION: Primary | ICD-10-CM

## 2024-04-11 DIAGNOSIS — F32.9 REACTIVE DEPRESSION: ICD-10-CM

## 2024-04-11 RX ORDER — ESCITALOPRAM OXALATE 10 MG/1
10 TABLET ORAL DAILY
Qty: 90 TABLET | Refills: 1 | Status: SHIPPED | OUTPATIENT
Start: 2024-04-11 | End: 2024-04-11

## 2024-04-11 RX ORDER — ESCITALOPRAM OXALATE 10 MG/1
10 TABLET ORAL DAILY
Qty: 30 TABLET | Refills: 0 | Status: SHIPPED | OUTPATIENT
Start: 2024-04-11

## 2024-04-18 DIAGNOSIS — E03.8 HYPOTHYROIDISM DUE TO HASHIMOTO'S THYROIDITIS: ICD-10-CM

## 2024-04-18 DIAGNOSIS — I10 PRIMARY HYPERTENSION: ICD-10-CM

## 2024-04-18 DIAGNOSIS — E06.3 HYPOTHYROIDISM DUE TO HASHIMOTO'S THYROIDITIS: ICD-10-CM

## 2024-04-18 DIAGNOSIS — R32 URINARY INCONTINENCE, UNSPECIFIED TYPE: ICD-10-CM

## 2024-04-18 DIAGNOSIS — E78.00 PURE HYPERCHOLESTEROLEMIA: ICD-10-CM

## 2024-04-18 DIAGNOSIS — F32.9 REACTIVE DEPRESSION: ICD-10-CM

## 2024-04-18 DIAGNOSIS — Z23 NEED FOR PNEUMOCOCCAL 20-VALENT CONJUGATE VACCINATION: ICD-10-CM

## 2024-04-18 DIAGNOSIS — E55.9 VITAMIN D DEFICIENCY: ICD-10-CM

## 2024-04-18 DIAGNOSIS — E53.8 VITAMIN B12 DEFICIENCY: ICD-10-CM

## 2024-04-18 DIAGNOSIS — R73.03 PREDIABETES: ICD-10-CM

## 2024-04-22 RX ORDER — LEVOTHYROXINE SODIUM 0.07 MG/1
75 TABLET ORAL DAILY
Qty: 30 TABLET | Refills: 0 | Status: SHIPPED | OUTPATIENT
Start: 2024-04-22

## 2024-07-31 DIAGNOSIS — G31.84 MILD COGNITIVE IMPAIRMENT: ICD-10-CM

## 2024-07-31 RX ORDER — DONEPEZIL HYDROCHLORIDE 5 MG/1
TABLET, FILM COATED ORAL
Qty: 30 TABLET | Refills: 2 | Status: SHIPPED | OUTPATIENT
Start: 2024-07-31